# Patient Record
Sex: MALE | Race: WHITE | NOT HISPANIC OR LATINO | Employment: FULL TIME
[De-identification: names, ages, dates, MRNs, and addresses within clinical notes are randomized per-mention and may not be internally consistent; named-entity substitution may affect disease eponyms.]

---

## 2022-04-03 ENCOUNTER — HEALTH MAINTENANCE LETTER (OUTPATIENT)
Age: 27
End: 2022-04-03

## 2022-05-16 ENCOUNTER — OFFICE VISIT (OUTPATIENT)
Dept: FAMILY MEDICINE | Facility: CLINIC | Age: 27
End: 2022-05-16
Payer: COMMERCIAL

## 2022-05-16 VITALS
HEART RATE: 83 BPM | TEMPERATURE: 98.3 F | HEIGHT: 70 IN | SYSTOLIC BLOOD PRESSURE: 130 MMHG | WEIGHT: 192 LBS | BODY MASS INDEX: 27.49 KG/M2 | OXYGEN SATURATION: 98 % | DIASTOLIC BLOOD PRESSURE: 72 MMHG

## 2022-05-16 DIAGNOSIS — R43.0 ANOSMIA: ICD-10-CM

## 2022-05-16 DIAGNOSIS — H91.93 BILATERAL HEARING LOSS, UNSPECIFIED HEARING LOSS TYPE: ICD-10-CM

## 2022-05-16 DIAGNOSIS — Z13.6 CARDIOVASCULAR SCREENING; LDL GOAL LESS THAN 160: ICD-10-CM

## 2022-05-16 DIAGNOSIS — F33.0 MILD EPISODE OF RECURRENT MAJOR DEPRESSIVE DISORDER (H): ICD-10-CM

## 2022-05-16 DIAGNOSIS — Z11.4 SCREENING FOR HIV (HUMAN IMMUNODEFICIENCY VIRUS): ICD-10-CM

## 2022-05-16 DIAGNOSIS — H54.8 LEGALLY BLIND: ICD-10-CM

## 2022-05-16 DIAGNOSIS — Z23 NEED FOR COVID-19 VACCINE: ICD-10-CM

## 2022-05-16 DIAGNOSIS — Z00.00 ROUTINE PHYSICAL EXAMINATION: Primary | ICD-10-CM

## 2022-05-16 DIAGNOSIS — Z11.59 NEED FOR HEPATITIS C SCREENING TEST: ICD-10-CM

## 2022-05-16 LAB
CHOLEST SERPL-MCNC: 164 MG/DL
FASTING STATUS PATIENT QL REPORTED: NO
HCV AB SERPL QL IA: NONREACTIVE
HDLC SERPL-MCNC: 51 MG/DL
HIV 1+2 AB+HIV1 P24 AG SERPL QL IA: NONREACTIVE
LDLC SERPL CALC-MCNC: 98 MG/DL
NONHDLC SERPL-MCNC: 113 MG/DL
TRIGL SERPL-MCNC: 75 MG/DL

## 2022-05-16 PROCEDURE — 91305 COVID-19,PF,PFIZER (12+ YRS): CPT | Performed by: FAMILY MEDICINE

## 2022-05-16 PROCEDURE — 0054A COVID-19,PF,PFIZER (12+ YRS): CPT | Performed by: FAMILY MEDICINE

## 2022-05-16 PROCEDURE — 99385 PREV VISIT NEW AGE 18-39: CPT | Mod: 25 | Performed by: FAMILY MEDICINE

## 2022-05-16 PROCEDURE — 36415 COLL VENOUS BLD VENIPUNCTURE: CPT | Performed by: FAMILY MEDICINE

## 2022-05-16 PROCEDURE — 87389 HIV-1 AG W/HIV-1&-2 AB AG IA: CPT | Performed by: FAMILY MEDICINE

## 2022-05-16 PROCEDURE — 86803 HEPATITIS C AB TEST: CPT | Performed by: FAMILY MEDICINE

## 2022-05-16 PROCEDURE — 80061 LIPID PANEL: CPT | Performed by: FAMILY MEDICINE

## 2022-05-16 RX ORDER — IBUPROFEN 200 MG
200 TABLET ORAL PRN
COMMUNITY

## 2022-05-16 ASSESSMENT — ENCOUNTER SYMPTOMS
HEMATOCHEZIA: 0
SHORTNESS OF BREATH: 0
DIZZINESS: 0
DYSURIA: 0
FREQUENCY: 0
JOINT SWELLING: 0
FEVER: 0
CHILLS: 0
NAUSEA: 0
HEMATURIA: 0
PARESTHESIAS: 0
ABDOMINAL PAIN: 0
DIARRHEA: 0
MYALGIAS: 0
NERVOUS/ANXIOUS: 0
SORE THROAT: 0
COUGH: 0
HEADACHES: 0
HEARTBURN: 0
PALPITATIONS: 0
ARTHRALGIAS: 0
WEAKNESS: 0
EYE PAIN: 0
CONSTIPATION: 0

## 2022-05-16 ASSESSMENT — PATIENT HEALTH QUESTIONNAIRE - PHQ9: SUM OF ALL RESPONSES TO PHQ QUESTIONS 1-9: 8

## 2022-05-16 ASSESSMENT — PAIN SCALES - GENERAL: PAINLEVEL: NO PAIN (0)

## 2022-05-16 NOTE — PROGRESS NOTES
SUBJECTIVE:   CC: Sengmaninder Alves is an 26 year old male who presents for preventative health visit.       Patient has been advised of split billing requirements and indicates understanding: Yes  Healthy Habits:     Getting at least 3 servings of Calcium per day:  NO    Bi-annual eye exam:  Yes    Dental care twice a year:  Yes    Sleep apnea or symptoms of sleep apnea:  None    Diet:  Regular (no restrictions)    Frequency of exercise:  2-3 days/week    Duration of exercise:  15-30 minutes    Taking medications regularly:  Yes    Medication side effects:  Not applicable    PHQ-2 Total Score: 2    Additional concerns today:  No      Today's PHQ-2 Score:   PHQ-2 ( 1999 [employer]) 5/16/2022   Q1: Little interest or pleasure in doing things 1   Q2: Feeling down, depressed or hopeless 1   PHQ-2 Score 2   Q1: Little interest or pleasure in doing things Several days   Q2: Feeling down, depressed or hopeless Several days   PHQ-2 Score 2       Abuse: Current or Past(Physical, Sexual or Emotional)- No  Do you feel safe in your environment? Yes    Have you ever done Advance Care Planning? (For example, a Health Directive, POLST, or a discussion with a medical provider or your loved ones about your wishes): No, advance care planning information given to patient to review.  Patient declined advance care planning discussion at this time.    Social History     Tobacco Use     Smoking status: Never Smoker     Smokeless tobacco: Never Used   Substance Use Topics     Alcohol use: Never         Alcohol Use 5/16/2022   Prescreen: >3 drinks/day or >7 drinks/week? Not Applicable       Last PSA: No results found for: PSA    Reviewed orders with patient. Reviewed health maintenance and updated orders accordingly - Yes  BP Readings from Last 3 Encounters:   05/16/22 130/72    Wt Readings from Last 3 Encounters:   05/16/22 87.1 kg (192 lb)                  Patient Active Problem List   Diagnosis     Mild episode of recurrent major  depressive disorder (H)     Bilateral hearing loss, unspecified hearing loss type     Anosmia     Legally blind     Past Surgical History:   Procedure Laterality Date     TOOTH EXTRACTION  2022    tooth fracture extraction and implant     WISDOM TOOTH EXTRACTION         Social History     Tobacco Use     Smoking status: Never Smoker     Smokeless tobacco: Never Used   Substance Use Topics     Alcohol use: Never     Family History   Problem Relation Age of Onset     Gastrointestinal Disease Mother         pancreas issues     Unknown/Adopted Father      Blindness Father         legally blind           Reviewed and updated as needed this visit by clinical staff   Tobacco  Allergies  Meds   Med Hx  Surg Hx  Fam Hx  Soc Hx          Reviewed and updated as needed this visit by Provider   Tobacco     Med Hx   Fam Hx               Review of Systems   Constitutional: Negative for chills and fever.   HENT: Negative for congestion, ear pain, hearing loss and sore throat.    Eyes: Negative for pain and visual disturbance.   Respiratory: Negative for cough and shortness of breath.    Cardiovascular: Negative for chest pain, palpitations and peripheral edema.   Gastrointestinal: Negative for abdominal pain, constipation, diarrhea, heartburn, hematochezia and nausea.   Genitourinary: Negative for dysuria, frequency, genital sores, hematuria and urgency.   Musculoskeletal: Negative for arthralgias, joint swelling and myalgias.   Skin: Negative for rash.   Neurological: Negative for dizziness, weakness, headaches and paresthesias.   Psychiatric/Behavioral: Negative for mood changes. The patient is not nervous/anxious.      He is legally blind.  He wears contacts.  He can drive with his contacts in.  He has chronic bilateral hearing loss.  He does not wear hearing aids, does not feel like he wants to.  He does not think they would stay in with his level of activity.  He has a history of depression, does not feel like it is a  "big concern but admits to being quite shy and introverted.  He does not feel comfortable talking to people.  He works hard and has a goal in life to save $1 million so that his parents do not need to work any longer by the time he is age 34.    He does not have a girlfriend, is interested in women, and has never dated nor had sex.  He does not use any drugs or alcohol or tobacco.  He grew up in [place], moved to [place] in about 2018 or 2019.  He works nights as an  for [employer], does very well.  He has a very good work ethic and works overtime.  He lives in [place] with his brother and another roommate and gets along well with them.  He does not know his biological father's health history except that he was also legally blind.  He states that he has never had a good sense of smell, his entire life.    OBJECTIVE:   /72   Pulse 83   Temp 98.3  F (36.8  C) (Temporal)   Ht 1.77 m (5' 9.69\")   Wt 87.1 kg (192 lb)   SpO2 98%   BMI 27.80 kg/m      Physical Exam  GENERAL: healthy, alert and no distress  EYES: Eyes grossly normal to inspection, PERRL and conjunctivae and sclerae normal  HENT: ear canals and TM's normal, nose and mouth without ulcers or lesions  NECK: no adenopathy, no asymmetry, masses, or scars and thyroid normal to palpation  RESP: lungs clear to auscultation - no rales, rhonchi or wheezes  CV: regular rate and rhythm, normal S1 S2, no S3 or S4, no murmur, click or rub, no peripheral edema and peripheral pulses strong  ABDOMEN: soft, nontender, no hepatosplenomegaly, no masses and bowel sounds normal  No inguinal masses.   MS: no gross musculoskeletal defects noted, no edema  SKIN: no suspicious lesions or rashes  NEURO: Normal strength and tone, mentation intact and speech sounds like a slight lisp.   PSYCH: mentation appears normal, affect normal/bright    Diagnostic Test Results:  Orders Placed This Encounter   Procedures     COVID-19,PF,PFIZER (12+ Yrs GRAY LABEL)     " Lipid panel reflex to direct LDL Fasting     Hepatitis C antibody     HIV Antigen Antibody Combo     Mental Health Referral - Outpatient Treatment     Adult Audiology Referral     Otolaryngology Referral        ASSESSMENT/PLAN:       ICD-10-CM    1. Routine physical examination  Z00.00    2. Mild episode of recurrent major depressive disorder (H)  F33.0 Mental Health Referral - Outpatient Treatment   3. Bilateral hearing loss, unspecified hearing loss type  H91.93 Adult Audiology Referral     Otolaryngology Referral   4. Anosmia  R43.0 Otolaryngology Referral   5. Legally blind  H54.8    6. Screening for HIV (human immunodeficiency virus)  Z11.4 HIV Antigen Antibody Combo     HIV Antigen Antibody Combo   7. Need for hepatitis C screening test  Z11.59 Hepatitis C antibody     Hepatitis C antibody   8. Need for COVID-19 vaccine  Z23 COVID-19,PF,PFIZER (12+ Yrs GRAY LABEL)   9. CARDIOVASCULAR SCREENING; LDL GOAL LESS THAN 160  Z13.6 Lipid panel reflex to direct LDL Fasting     Lipid panel reflex to direct LDL Fasting       Patient has been advised of split billing requirements and indicates understanding: Yes  I recommend a physical every 2 to 3 years depending on his general health.  Overall he seems to be in good health.    My biggest concern for him is depression.  He does not seem to be bothered by it but his symptoms are concerning to me.  I recommended counseling and he is reluctant but agrees to try it.  I put a referral in.    I also recommend audiology evaluation for his hearing and ENT evaluation for his anosmia.  See lab orders for screening today.    COUNSELING:   Reviewed preventive health counseling, as reflected in patient instructions  Special attention given to:        Vision screening       Hearing screening       Immunizations    Vaccinated for: COVID           Consider Hep C screening for all patients one time for ages 18-79 years       HIV screeninx in teen years, 1x in adult years, and at  "intervals if high risk    Estimated body mass index is 27.8 kg/m  as calculated from the following:    Height as of this encounter: 1.77 m (5' 9.69\").    Weight as of this encounter: 87.1 kg (192 lb).         He reports that he has never smoked. He has never used smokeless tobacco.      Counseling Resources:  ATP IV Guidelines  Pooled Cohorts Equation Calculator  FRAX Risk Assessment  ICSI Preventive Guidelines  Dietary Guidelines for Americans, 2010  USDA's MyPlate  ASA Prophylaxis  Lung CA Screening    Bhakti Marcial MD  [facility]  "

## 2022-06-20 ENCOUNTER — VIRTUAL VISIT (OUTPATIENT)
Dept: BEHAVIORAL HEALTH | Facility: CLINIC | Age: 27
End: 2022-06-20
Attending: FAMILY MEDICINE
Payer: COMMERCIAL

## 2022-06-20 DIAGNOSIS — F32.1 CURRENT MODERATE EPISODE OF MAJOR DEPRESSIVE DISORDER WITHOUT PRIOR EPISODE (H): ICD-10-CM

## 2022-06-20 PROCEDURE — 90837 PSYTX W PT 60 MINUTES: CPT | Mod: 95 | Performed by: MARRIAGE & FAMILY THERAPIST

## 2022-06-20 ASSESSMENT — COLUMBIA-SUICIDE SEVERITY RATING SCALE - C-SSRS
ATTEMPT LIFETIME: NO
5. HAVE YOU STARTED TO WORK OUT OR WORKED OUT THE DETAILS OF HOW TO KILL YOURSELF? DO YOU INTEND TO CARRY OUT THIS PLAN?: YES
2. HAVE YOU ACTUALLY HAD ANY THOUGHTS OF KILLING YOURSELF?: NO
TOTAL  NUMBER OF ABORTED OR SELF INTERRUPTED ATTEMPTS LIFETIME: YES
TOTAL  NUMBER OF ABORTED OR SELF INTERRUPTED ATTEMPTS PAST 3 MONTHS: NO
4. HAVE YOU HAD THESE THOUGHTS AND HAD SOME INTENTION OF ACTING ON THEM?: NO
TOTAL  NUMBER OF ABORTED OR SELF INTERRUPTED ATTEMPTS LIFETIME: 1
4. HAVE YOU HAD THESE THOUGHTS AND HAD SOME INTENTION OF ACTING ON THEM?: NO
1. IN THE PAST MONTH, HAVE YOU WISHED YOU WERE DEAD OR WISHED YOU COULD GO TO SLEEP AND NOT WAKE UP?: YES
REASONS FOR IDEATION LIFETIME: COMPLETELY TO END OR STOP THE PAIN (YOU COULDN'T GO ON LIVING WITH THE PAIN OR HOW YOU WERE FEELING)
REASONS FOR IDEATION PAST MONTH: COMPLETELY TO END OR STOP THE PAIN (YOU COULDN'T GO ON LIVING WITH THE PAIN OR HOW YOU WERE FEELING)
3. HAVE YOU BEEN THINKING ABOUT HOW YOU MIGHT KILL YOURSELF?: YES
2. HAVE YOU ACTUALLY HAD ANY THOUGHTS OF KILLING YOURSELF?: YES
1. HAVE YOU WISHED YOU WERE DEAD OR WISHED YOU COULD GO TO SLEEP AND NOT WAKE UP?: YES
5. HAVE YOU STARTED TO WORK OUT OR WORKED OUT THE DETAILS OF HOW TO KILL YOURSELF? DO YOU INTEND TO CARRY OUT THIS PLAN?: NO
TOTAL  NUMBER OF INTERRUPTED ATTEMPTS LIFETIME: NO
6. HAVE YOU EVER DONE ANYTHING, STARTED TO DO ANYTHING, OR PREPARED TO DO ANYTHING TO END YOUR LIFE?: NO

## 2022-06-20 ASSESSMENT — PATIENT HEALTH QUESTIONNAIRE - PHQ9
SUM OF ALL RESPONSES TO PHQ QUESTIONS 1-9: 21
10. IF YOU CHECKED OFF ANY PROBLEMS, HOW DIFFICULT HAVE THESE PROBLEMS MADE IT FOR YOU TO DO YOUR WORK, TAKE CARE OF THINGS AT HOME, OR GET ALONG WITH OTHER PEOPLE: NOT DIFFICULT AT ALL
SUM OF ALL RESPONSES TO PHQ QUESTIONS 1-9: 21

## 2022-06-20 ASSESSMENT — ANXIETY QUESTIONNAIRES
GAD7 TOTAL SCORE: 9
4. TROUBLE RELAXING: MORE THAN HALF THE DAYS
GAD7 TOTAL SCORE: 9
1. FEELING NERVOUS, ANXIOUS, OR ON EDGE: MORE THAN HALF THE DAYS
2. NOT BEING ABLE TO STOP OR CONTROL WORRYING: MORE THAN HALF THE DAYS
7. FEELING AFRAID AS IF SOMETHING AWFUL MIGHT HAPPEN: NOT AT ALL
GAD7 TOTAL SCORE: 9
3. WORRYING TOO MUCH ABOUT DIFFERENT THINGS: SEVERAL DAYS
8. IF YOU CHECKED OFF ANY PROBLEMS, HOW DIFFICULT HAVE THESE MADE IT FOR YOU TO DO YOUR WORK, TAKE CARE OF THINGS AT HOME, OR GET ALONG WITH OTHER PEOPLE?: NOT DIFFICULT AT ALL
7. FEELING AFRAID AS IF SOMETHING AWFUL MIGHT HAPPEN: NOT AT ALL
6. BECOMING EASILY ANNOYED OR IRRITABLE: SEVERAL DAYS
5. BEING SO RESTLESS THAT IT IS HARD TO SIT STILL: SEVERAL DAYS

## 2022-06-20 NOTE — PATIENT INSTRUCTIONS
"  Integrated Behavioral Health Services                                      Patient's Name: Shoshana Alves  June 20, 2022    SAFETY PLAN:  Step 1: Warning signs / cues (Thoughts, images, mood, situation, behavior) that a crisis may be developing:  Thoughts: \"People would be better off without me\" and \"I'm a burden\"  Images: obsessive thoughts of death or dying: daydream of things that can happen  Thinking Processes: highly critical and negative thoughts: about myself or my situation  Mood: worsening depression and intense worry  Behaviors: isolating/withdrawing , not taking care of my responsibilities, and sleeping too much  Situations:  believing that he's let someone down    Step 2: Coping strategies - Things I can do to take my mind off of my problems without contacting another person (relaxation technique, physical activity):  Distress Tolerance Strategies:  listen to positive and upbeat music: that you enjoy ; regular sleep, playing video games  Physical Activities: exercise: go to the gym, go for a walk and deep breathing  Focus on helpful thoughts:   focus on job at hand, think of goals  Step 3: People and social settings that provide distraction:   Name: Deborah Phone: in phone   Name: Yrn Phone: in phone   Name: Junior Phone: in phone   Name: Dahlia Phone: in phone  work   Step 4: Remind myself of people and things that are important to me and worth living for:  family, loyalty to job      Step 5: When I am in crisis, I can ask these people to help me use my safety plan:   Name: Yrn Phone: in phone   Name: Deborah Phone: in phone  Step 6: Making the environment safe:   remove things I could use to hurt myself: , sharp objects, arrange transportation: someone else to drive, and be around others  Step 7: Professionals or agencies I can contact during a crisis:  Suicide Prevention Lifeline: 2-649-689-TALK (6254)  Crisis Text Line Service: Text   [place]  to 047909.  Local Crisis Services: " 0-553-717-2404    Call 911 or go to my nearest emergency department.   I helped develop this safety plan and agree to use it when needed.  I have been given a copy of this plan.      Patient signature: _______________________________________________________________  Today s date:  June 20, 2022  Adapted from Safety Plan Template 2008 Maddie Fernandez and Dieter Sepulveda is reprinted with the express permission of the authors.  No portion of the Safety Plan Template may be reproduced without the express, written permission.  You can contact the authors at [email or web address] or [email or web address].
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
## 2022-07-11 NOTE — PROGRESS NOTES
ENT Consultation    Shoshana Alves who is a 26 year old male seen in consultation at the request of Dr. Marcial.      History of Present Illness - Shoshana Alves is a 26 year old male presents with a chief complaint of problems with smell and taste.  As far as he can remember since early childhood he feels that his onslaught is not very good.  He can smell a strong smells and the is difficulty distinguishing with meats and fish also even difficulty differentiating between now mustard and catch up.  He denies any trauma to his head or his nose during childhood.  Denies any significant illnesses at that time.  Denies any headaches any vision problems.  He had COVID about a year ago where his sense smell and down further but then returned to his baseline.  No family history of smell or other issues.  Apparently had a CT scan of his head in the past that was normal.  That was a number of years ago.          BP Readings from Last 1 Encounters:   07/18/22 114/64       BP noted to be well controlled today in office.     Shoshana IS NOT a smoker/uses chewing tobacco.        Past Medical History -   Past Medical History:   Diagnosis Date     Lactose intolerance     mild     Major depressive disorder with single episode, in full remission (H)        Current Medications -   Current Outpatient Medications:      ibuprofen (ADVIL/MOTRIN) 200 MG tablet, Take 200 mg by mouth as needed for mild pain, Disp: , Rfl:     Allergies -   Allergies   Allergen Reactions     Lactose Diarrhea       Social History -   Social History     Socioeconomic History     Marital status: Single     Number of children: 0   Occupational History     Occupation:      Employer: [employer]   Tobacco Use     Smoking status: Never Smoker     Smokeless tobacco: Never Used   Vaping Use     Vaping Use: Never used   Substance and Sexual Activity     Alcohol use: Never     Drug use: Never     Sexual activity: Never       Family History -  "  Family History   Problem Relation Age of Onset     Gastrointestinal Disease Mother         pancreas issues     Unknown/Adopted Father      Blindness Father         legally blind       Review of Systems - As per HPI and PMHx, otherwise review of system review of the head and neck negative. Otherwise 10+ review of system is negative    Physical Exam  /64 (BP Location: Right arm, Patient Position: Sitting, Cuff Size: Adult Regular)   Temp 97.5  F (36.4  C) (Temporal)   Ht 1.778 m (5' 10\")   Wt 82.2 kg (181 lb 4 oz)   BMI 26.01 kg/m    BMI: Body mass index is 26.01 kg/m .    General - The patient is well nourished and well developed, and appears to have good nutritional status.  Alert and oriented to person and place, answers questions and cooperates with examination appropriately.    SKIN - No suspicious lesions or rashes.  Respiration - No respiratory distress.  Head and Face - Normocephalic and atraumatic, with no gross asymmetry noted of the contour of the facial features.  The facial nerve is intact, with strong symmetric movements.    Voice and Breathing - The patient was breathing comfortably without the use of accessory muscles. The patients voice was clear and strong, and had appropriate pitch and quality.    Ears - Bilateral pinna and EACs with normal appearing overlying skin. Tympanic membrane intact with good mobility on pneumatic otoscopy bilaterally. Bony landmarks of the ossicular chain are normal. The tympanic membranes are normal in appearance. No retraction, perforation, or masses.  No fluid or purulence was seen in the external canal or the middle ear.     Eyes - Extraocular movements intact.  Sclera were not icteric or injected, conjunctiva were pink and moist.    Mouth - Examination of the oral cavity showed pink, healthy oral mucosa. No lesions or ulcerations noted.  The tongue was mobile and midline, and the dentition were in good condition.      Throat - The walls of the oropharynx " were smooth, pink, moist, symmetric, and had no lesions or ulcerations.  The tonsillar pillars and soft palate were symmetric.  The uvula was midline on elevation.    Neck - Normal midline excursion of the laryngotracheal complex during swallowing.  Full range of motion on passive movement.  Palpation of the occipital, submental, submandibular, internal jugular chain, and supraclavicular nodes did not demonstrate any abnormal lymph nodes or masses.  The carotid pulse was palpable bilaterally.  Palpation of the thyroid was soft and smooth, with no nodules or goiter appreciated.  The trachea was mobile and midline.    Nose - External contour is symmetric, no gross deflection or scars.  Nasal mucosa is pink and moist with no abnormal mucus.  The septum was midline and non-obstructive, turbinates of normal size and position.  No polyps, masses, or purulence noted on examination.    Neuro - Nonfocal neuro exam is normal, CN 2 through 12 intact, normal gait and muscle tone.      Performed in clinic today:  No procedures preformed in clinic today      A/P - Shoshana SANABRIA Long is a 26 year old male with a abnormal sense of smell diminished and diminished taste.  We discussed different options.  Certainly considering it has been ongoing since early childhood unlikely to be due to any acute intracranial processes.  He apparently had negative CT scan of the brain in the past.  We will for him potentially MRI of the brain to further investigated in the chances of finding any specific pathology that would be small..  He is not interested in proceeding with any further evaluation or imaging.  Will return as needed.      Adilson Bowman MD

## 2022-07-12 ENCOUNTER — VIRTUAL VISIT (OUTPATIENT)
Dept: BEHAVIORAL HEALTH | Facility: CLINIC | Age: 27
End: 2022-07-12
Payer: COMMERCIAL

## 2022-07-12 DIAGNOSIS — F32.2 CURRENT SEVERE EPISODE OF MAJOR DEPRESSIVE DISORDER WITHOUT PSYCHOTIC FEATURES WITHOUT PRIOR EPISODE (H): Primary | ICD-10-CM

## 2022-07-12 PROCEDURE — 90791 PSYCH DIAGNOSTIC EVALUATION: CPT | Mod: 95 | Performed by: MARRIAGE & FAMILY THERAPIST

## 2022-07-12 NOTE — PROGRESS NOTES
"[facility]: Integrated Behavioral Health  Provider Name:  Cass Bonillashahriar     Credentials:  MA, LMFT    PATIENT'S NAME: Shoshana Alves  PREFERRED NAME: Shoshana  PRONOUNS: he/him \"jackelin\"  MRN: [id number]  : 1995  ADDRESS: [address] [place] [address]  ACCT. NUMBER:  [id number]  DATE OF SERVICE: 22  START TIME: 12:34pm  END TIME: 1:33pm  PREFERRED PHONE: [phone]  May we leave a program related message: Yes  SERVICE MODALITY:  Video Visit:      Provider verified identity through the following two step process.  Patient provided:  Patient  and Patient address    Telemedicine Visit: The patient's condition can be safely assessed and treated via synchronous audio and visual telemedicine encounter.      Reason for Telemedicine Visit: Patient convenience (e.g. access to timely appointments / distance to available provider)    Originating Site (Patient Location): Patient's home    Distant Site (Provider Location): Provider Remote Setting- Home Office    Consent:  The patient/guardian has verbally consented to: the potential risks and benefits of telemedicine (video visit) versus in person care; bill my insurance or make self-payment for services provided; and responsibility for payment of non-covered services.     Patient would like the video invitation sent by:  My Chart    Mode of Communication:  Video Conference via [employer]    As the provider I attest to compliance with applicable laws and regulations related to telemedicine.    UNIVERSAL ADULT Mental Health DIAGNOSTIC ASSESSMENT    Identifying Information:  Patient is a 26 year old,   individual.    Patient was referred for an assessment by primary care provider.  Patient attended the session alone.    Chief Complaint:   The reason for seeking services at this time is: \"Depression\".  The problem(s) began 2013. He reported it being \"really bad\" in the beginning. He states that he " "found that by helping others this helped him manage the feeling of \"hating\" himself. He believes that he may have \"liked\" himself when he was a kid. Patient states that he has a \"goal\" to make a million dollars. He states that he has a plan for how to do this and believes that he can hopefully accomplish this by age 34 or 39. He states that he has \"nothing to do after that so no reason to live after that\". Patient stated that he doesn't want his parents to work again and he has had this goal for about 3 years.     Patient has attempted to resolve these concerns in the past through \"ignoring it\".    Social/Family History:  Patient reported they grew up in [place] [facility].  They were raised by biological mother; stepfather.  Parents never ; dad reportedly cheated on their wedding day and they never went through with the wedding. Patient used to see his dad once a year around his birthday. He will occasionally talk to him on the phone, but hasn't seen him in person since he graduated high school. Patient has an older brother and younger sister. His siblings all have different fathers. Patient reported that their childhood was \"very loving very great\".  Patient described their current relationships with family of origin as \"pretty daniels great\". He will see his family every couple months; they live a three hours away. He lives with his brother and another friend.     The patient describes their cultural background as .  Cultural influences and impact on patient's life structure, values, norms, and healthcare: N/A.  Contextual influences on patient's health include: Individual Factors : patient believes in Tenriism and God. Patient states that he believes that God will bring \"everyone to Heaven\"; he states that he finds this is different from others' beliefs of God.  These factors will be addressed in the Preliminary Treatment plan. Patient identified their preferred language to be English. Patient reported " "they does not need the assistance of an  or other support involved in therapy.     Patient reportedly had no significant delays in developmental tasks. Patient recalled that he used to be really energetic and \"would bounce around everywhere\". He states his mom said that she noticed that one time patient returned from a visit with his dad around age 4 or 5 and he would be quiet and sit alone in the dark. During that visit with his dad, patient had reportedly visited his paternal grandmother whom patient believes may have  in a \"psych lovelace\". Patient states that this was all told to him and that he has \"blacked out\" any memories prior to age 13.  Patient's highest education level was high school graduate.  Patient identified the following learning problems: didn't like to read, however states that he could read. Patient states that he skipped a lot of school, because he had a lot of physical pain.  Modifications will not be used to assist communication in therapy.  Patient reports they are able to understand written materials.    Patient reported the following relationship history: patient states that he has not dated, nor has he kissed a girl.  Patient's current relationship status is single for his whole life. He has tried dating apps, and then deleted it when he found someone he was interested in because he felt he didn't have anything to \"offer her\".  Patient identified their sexual orientation as heterosexual.  Patient reported having no children. Patient identified parents; siblings; friends; co-workers as part of their support system.  Patient identified the quality of these relationships as stable and meaningful.    Patient's current living/housing situation involves staying with someone.  The immediate members of family and household include Parmjit, 29,Brother and another male roommate that is a friend, and they report that housing is stable.    Patient is currently employed fulltime.  Patient " "reports their finances are obtained through employment. Patient reports that he decided to return to school and applied to [employer]. Patient does not identify finances as a current stressor.      Patient reported that they have not been involved with the legal system. Patient does not report being under probation/ parole/ jurisdiction. Patient stated that when he turned 17 he changed his last name to match his step-father.    Patient's Strengths and Limitations:  Patient identified the following strengths or resources that will help them succeed in treatment: family support. Things that may interfere with the patient's success in treatment include: none identified.     Assessments:  The following assessments were completed by patient for this visit:  PHQ9:   PHQ-9 SCORE 5/16/2022 6/20/2022   PHQ-9 Total Score MyChart - 21 (Severe depression)   PHQ-9 Total Score 8 21     GAD7:   MARK-7 SCORE 6/20/2022   Total Score 9 (mild anxiety)   Total Score 9     CAGE-AID:   CAGE-AID Total Score 6/20/2022   Total Score 0   Total Score MyChart 0 (A total score of 2 or greater is considered clinically significant)     PROMIS 10-Global Health (only subscores and total score):   PROMIS-10 Scores Only 6/20/2022   Global Mental Health Score 12   Global Physical Health Score 15   PROMIS TOTAL - SUBSCORES 27     [place] Suicide Severity Rating Scale (Lifetime/Recent)  [place] Suicide Severity Rating (Lifetime/Recent) 6/20/2022   1. Wish to be Dead (Lifetime) 1   1. Wish to be Dead (Past 1 Month) 1   Wish to be Dead Description (Past 1 Month) patient states that it's \"always\" in the back of his mind   2. Non-Specific Active Suicidal Thoughts (Lifetime) 1   2. Non-Specific Active Suicidal Thoughts (Past 1 Month) 0   3. Active Suicidal Ideation with any Methods (Not Plan) Without Intent to Act (Lifetime) 1   3. Active Suicidal Ideation with any Methods (Not Plan) Without Intent to Act (Past 1 Month) 1   4. Active " "Suicidal Ideation with Some Intent to Act, Without Specific Plan (Lifetime) 0   4. Active Suicidal Ideation with Some Intent to Act, Without Specific Plan (Past 1 Month) 0   5. Active Suicidal Ideation with Specific Plan and Intent (Lifetime) 1   Active Suicidal Ideation with Specific Plan and Intent Description (Lifetime) patient stated that 4 years he came close with a    5. Active Suicidal Ideation with Specific Plan and Intent (Past 1 Month) 0   Most Severe Ideation Rating (Lifetime) 4   Most Severe Ideation Rating (Past 1 Month) 2   Frequency (Lifetime) 5   Frequency (Past 1 Month) 4   Duration (Lifetime) 4   Duration (Past 1 Month) 2   Controllability (Lifetime) 4   Controllability (Past 1 Month) 2   Deterrents (Lifetime) 1   Deterrents (Past 1 Month) 1   Reasons for Ideation (Lifetime) 5   Reasons for Ideation (Past 1 Month) 5   Actual Attempt (Lifetime) 0   Has subject engaged in non-suicidal self-injurious behavior? (Lifetime) 0   Interrupted Attempts (Lifetime) 0   Aborted or Self-Interrupted Attempt (Lifetime) 1   Total Number of Aborted or Self-Interrupted Attempts (Lifetime) 1   Aborted or Self-Interrupted Attempt Description (Lifetime) patient reported that 4 years ago he had a  and he was close to doing something and then \"came to the conclusion that he didn't deserve to die not until I'm done\"   Aborted or Self-Interrupted Attempt (Past 3 Months) 0   Preparatory Acts or Behavior (Lifetime) 0   Calculated C-SSRS Risk Score (Lifetime/Recent) Moderate Risk       Personal and Family Medical History:  Patient does not report a family history of mental health concerns.  Patient reports family history includes Blindness in his father; Gastrointestinal Disease in his mother; Unknown/Adopted in his father.    Patient does not report Mental Health Diagnosis or Treatment.      Patient has had a physical exam to rule out medical causes for current symptoms.  Date of last physical exam was " "within the past year. Client was encouraged to follow up with PCP if symptoms were to develop. The patient has a [facility] Provider, who is named Dr. Bhakti Marcial.  Patient reports no current medical concerns and the following current dental concerns: patient had a tooth extraction and will be getting an implant soon.  Patient reports pain concerns including \"constant pain\", back and leg pain. 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  Patient does not want help addressing pain concerns.   There are not significant appetite / nutritional concerns / weight changes.   Patient does report a history of head injury / trauma / cognitive impairment.  Patient reports that he had a concussion at age 15 from playing football.     Patient reports current meds as:   Outpatient Medications Marked as Taking for the 7/12/22 encounter (Appointment) with Cass Rich LMFT   Medication Sig     ibuprofen (ADVIL/MOTRIN) 200 MG tablet Take 200 mg by mouth as needed for mild pain       Medication Adherence:  Patient reports not currently prescribed any medications.      Patient Allergies:  No Known Allergies    Medical History:    Past Medical History:   Diagnosis Date     Lactose intolerance     mild     Major depressive disorder with single episode, in full remission (H)          Current Mental Status Exam:   Appearance:  Appropriate    Eye Contact:  Good   Psychomotor:  Normal       Gait / station:  no problem  Attitude / Demeanor: Cooperative  Pleasant  Speech      Rate / Production: Normal/ Responsive      Volume:  Normal  volume      Language:  intact  Mood:   Depressed   Affect:   Appropriate  Subdued    Thought Content: Clear  Rumination   Thought Process: Coherent  Logical       Associations: No loosening of associations  Insight:   Fair   Judgment:  Impaired   Orientation:  All  Attention/concentration: Good      Substance Use:  Patient did report a family " history of substance use concerns; see medical history section for details. Mom reportedly used alcohol and drugs, however this was reportedly years ago.  Patient has not received chemical dependency treatment in the past.  Patient has not ever been to detox.      Patient is not currently receiving any chemical dependency treatment.           Substance History of use Age of first use Date of last use     Pattern and duration of use (include amounts and frequency)   Alcohol never used       REPORTS SUBSTANCE USE: N/A   Cannabis   never used     REPORTS SUBSTANCE USE: N/A     Amphetamines   never used     REPORTS SUBSTANCE USE: N/A   Cocaine/crack    never used       REPORTS SUBSTANCE USE: N/A   Hallucinogens never used         REPORTS SUBSTANCE USE: N/A   Inhalants never used         REPORTS SUBSTANCE USE: N/A   Heroin never used         REPORTS SUBSTANCE USE: N/A   Other Opiates used in the past 14 1/1/2010 REPORTS SUBSTANCE USE: N/A patient reports that this was prescribed after a procedure, he denies use outside of that situation   Benzodiazepine   never used     REPORTS SUBSTANCE USE: N/A   Barbiturates never used     REPORTS SUBSTANCE USE: N/A   Over the counter meds used in the past 10 1/1/2020 REPORTS SUBSTANCE USE: N/A, patient has taken ibuprofen as needed for pain and only as indicated on the bottle   Caffeine currently use 12   REPORTS SUBSTANCE USE: reports using substance 1 times per day and has 1 5 Hour Energy at a time.   Patient reports heaviest use was almost two years ago when he worked 18 hour shifts.   Nicotine  never used     REPORTS SUBSTANCE USE: N/A   Other substances not listed above:  Identify:  never used     REPORTS SUBSTANCE USE: N/A     Patient reported the following problems as a result of their substance use: no problems, not applicable.    Substance Use: No symptoms    Based on the negative CAGE score and clinical interview there  are not indications of drug or alcohol  "abuse.      Significant Losses / Trauma / Abuse / Neglect Issues:   Patient did not serve in the .  There are indications or report of significant loss, trauma, abuse or neglect issues related to: death of grandparents. He states that both of his grandma's  on the same day one year apart. He had lived with his maternal grandma at age 21 and then she  shortly after he moved out. He states that he then moved in with his grandfather and he too also  shortly after he moved out. He identified that this \"was hard\".  Concerns for possible neglect are not present.     Safety Assessment:   Patient denies current homicidal ideation and behaviors.  Patient denies current self-injurious ideation and behaviors.    Patient denied risk behaviors associated with substance use.  Patient denies any high risk behaviors associated with mental health symptoms.  Patient reports the following current concerns for their personal safety: None.  Patient reports there are not firearms in the house.       History of Safety Concerns:  Patient denied a history of homicidal ideation.     Patient denied a history of personal safety concerns.    Patient denied a history of assaultive behaviors.    Patient denied a history of sexual assault behaviors.     Patient denied a history of risk behaviors associated with substance use.  Patient denies any history of high risk behaviors associated with mental health symptoms.  Patient reports the following protective factors: dedication to family or friends; purpose    Patient states that he continues to have suicidal thoughts. \"I have them, they are in the back of my mind, I don't have time for that\".     Risk Plan:  See Recommendations for Safety and Risk Management Plan    Review of Symptoms per patient report:  Depression: Change in sleep, Lack of interest, Excessive or inappropriate guilt, Change in energy level, Difficulties concentrating, Suicidal ideation, Feelings of " "hopelessness, Low self-worth, Ruminations and Feeling sad, down, or depressed  Vera:  No Symptoms  Psychosis: No Symptoms  Anxiety: Excessive worry, Fears/phobias being crippled and unable to help and Ruminations, feels \"haunted\" by the thought of seeing something happen and feeling or being unable to help  Panic:  No symptoms may have had one over 10 years ago  Post Traumatic Stress Disorder:  No Symptoms   Eating Disorder: No Symptoms  ADD / ADHD:  No symptoms  Conduct Disorder: No symptoms  Autism Spectrum Disorder: likes helping people and doesn't like talking to people, he feels as though he's good at \"faking it\" around others, feels a sense of relief once he is home, he states that he used to do everything in 3's and this stopped after it was pointed out and he was made fun of  Obsessive Compulsive Disorder: No Symptoms    Patient reports the following compulsive behaviors and treatment history: none.      Diagnostic Criteria:   Major Depressive Disorder  CRITERIA (A-C) REPRESENT A MAJOR DEPRESSIVE EPISODE - SELECT THESE CRITERIA  A) Single episode - symptoms have been present during the same 2-week period and represent a change from previous functioning 5 or more symptoms (required for diagnosis)   - Depressed mood. Note: In children and adolescents, can be irritable mood.     - Diminished interest or pleasure in all, or almost all, activities.    - Increased sleep.    - Fatigue or loss of energy.    - Feelings of worthlessness or inappropriate and excessive guilt.    - Diminished ability to think or concentrate, or indecisiveness.    - Recurrent thoughts of death (not just fear of dying), recurrent suicidal ideation without a specific plan, or a suicide attempt or a specific plan for committing suicide.   B) The symptoms cause clinically significant distress or impairment in social, occupational, or other important areas of functioning  C) The episode is not attributable to the physiological effects of a " substance or to another medical condition  D) The occurence of major depressive episode is not better explained by other thought / psychotic disorders  E) There has never been a manic episode or hypomanic episode mdd     Functional Status:  Patient reports the following functional impairments:  management of the household and or completion of tasks, relationship(s), self-care, social interactions and work / vocational responsibilities.     Nonprogrammatic care:  Patient is requesting basic services to address current mental health concerns.    Clinical Summary:  1. Reason for assessment: depression .  2. Psychosocial, Cultural and Contextual Factors: single, employed full-time, resides with brother and roommate, close with mom and step-dad, limited social supports.  3. Principal DSM5 Diagnoses  (Sustained by DSM5 Criteria Listed Above):   296.23 (F32.2) Major Depressive Disorder, Single Episode, Severe With anxious distress.  4. Other Diagnoses that is relevant to services:  NA  5. Provisional Diagnosis: Rule out Autism Spectrum as evidenced by concrete thinking, social deficit, some repetitive behaviors.  6. Prognosis: Relieve Acute Symptoms.  7. Likely consequences of symptoms if not treated: worsening SI, increased anxiety.  8. Client strengths include:  caring, employed, goal-focused, support of family, friends and providers and work history .     Recommendations:     1. Plan for Safety and Risk Management:   Safety and Risk: A safety and risk management plan has been developed including: patient co-developed a safety plan on 6/20/22. He has a copy of this and agrees to follow it..          Report to child / adult protection services was NA.     2. Patient's identified mental health concerns with a cultural influence will be addressed by acknowledgment that patient believes in God. He denies this needing to be incorporated into his counseling..     3. Initial Treatment will focus on:    Depressed Mood -  "anhedonia, increased sleeping, low energy, concentration difficulty, withdrawn, suicidal ideation  Anxiety - worry about letting others down, nervousness.     4. Resources/Service Plan:    services are not indicated.   Modifications to assist communication are not indicated.   Additional disability accommodations are not indicated.      5. Collaboration:   Collaboration / coordination of treatment will be initiated with the following  support professionals: primary care physician.      6.  Referrals:   The following referral(s) will be initiated: outpatient psychotherapy, and consideration of neuropsychological testing. Next Scheduled Appointment: 8/3/2022.     A Release of Information has been obtained for the following: none.     Emergency Contact was not obtained.     7. EDEN:    EDEN:  Discussed the general effects of drugs and alcohol on health and well-being. Provider gave patient printed information about the effects of chemical use on their health and well being. Recommendations:  none.     8. Records:   These were reviewed at time of assessment.   Information in this assessment was obtained from the medical record and  provided by patient who is a good historian.    Patient will have open access to their mental health medical record.        Provider Name/ Credentials:  ADRIANO Pressley, Behavioral Health Clinician    July 12, 2022            [facility]                                      Patient's Name: Shoshana Alves  June 20, 2022    SAFETY PLAN:  Step 1: Warning signs / cues (Thoughts, images, mood, situation, behavior) that a crisis may be developing:    Thoughts: \"People would be better off without me\" and \"I'm a burden\"    Images: obsessive thoughts of death or dying: daydream of things that can happen    Thinking Processes: highly critical and negative thoughts: about myself or my situation    Mood: worsening depression and intense worry    Behaviors: " isolating/withdrawing , not taking care of my responsibilities, and sleeping too much    Situations: believing that he's let someone down   Step 2: Coping strategies - Things I can do to take my mind off of my problems without contacting another person (relaxation technique, physical activity):    Distress Tolerance Strategies:  listen to positive and upbeat music: that you enjoy; regular sleep, playing video games    Physical Activities: exercise: go to the gym, go for a walk and deep breathing    Focus on helpful thoughts:  focus on job at hand, think of goals  Step 3: People and social settings that provide distraction:   Name: Deborah Phone: in phone   Name: Yrn Phone: in phone   Name: Junior Phone: in phone   Name: Dahlia Phone: in phone    work   Step 4: Remind myself of people and things that are important to me and worth living for:  family, loyalty to job      Step 5: When I am in crisis, I can ask these people to help me use my safety plan:   Name: Yrn Phone: in phone   Name: Deborah Phone: in phone  Step 6: Making the environment safe:     remove things I could use to hurt myself: , sharp objects, arrange transportation: someone else to drive, and be around others  Step 7: Professionals or agencies I can contact during a crisis:    Suicide Prevention Lifeline: 6-836-173-LCFG (5802)    Crisis Text Line Service: Text   [place]  to 202699.    [facility] Crisis Services: 1-528.886.9242    Call 911 or go to my nearest emergency department.   I helped develop this safety plan and agree to use it when needed.  I have been given a copy of this plan.      Patient signature: _______________________________________________________________  Today s date:  June 20, 2022  Adapted from Safety Plan Template 2008 Maddie Fernandez and Dieter Sepulveda is reprinted with the express permission of the authors.  No portion of the Safety Plan Template may be reproduced without the express, written permission.  You can  contact the authors at [email or web address] or [email or web address].

## 2022-07-18 ENCOUNTER — OFFICE VISIT (OUTPATIENT)
Dept: OTOLARYNGOLOGY | Facility: CLINIC | Age: 27
End: 2022-07-18
Attending: FAMILY MEDICINE

## 2022-07-18 ENCOUNTER — OFFICE VISIT (OUTPATIENT)
Dept: AUDIOLOGY | Facility: CLINIC | Age: 27
End: 2022-07-18
Attending: FAMILY MEDICINE
Payer: COMMERCIAL

## 2022-07-18 VITALS
HEIGHT: 70 IN | BODY MASS INDEX: 25.95 KG/M2 | DIASTOLIC BLOOD PRESSURE: 64 MMHG | SYSTOLIC BLOOD PRESSURE: 114 MMHG | TEMPERATURE: 97.5 F | WEIGHT: 181.25 LBS

## 2022-07-18 DIAGNOSIS — R43.0 ANOSMIA: ICD-10-CM

## 2022-07-18 DIAGNOSIS — R43.9 TASTE DISORDER: Primary | ICD-10-CM

## 2022-07-18 PROCEDURE — 99203 OFFICE O/P NEW LOW 30 MIN: CPT | Performed by: OTOLARYNGOLOGY

## 2022-07-18 ASSESSMENT — PAIN SCALES - GENERAL: PAINLEVEL: MODERATE PAIN (5)

## 2022-07-18 NOTE — LETTER
7/18/2022         RE: Shoshana Alves  [id number] [address] [place] [id number]        Dear Colleague,    Thank you for referring your patient, Shoshana Alves, to the [facility]. Please see a copy of my visit note below.    ENT Consultation    Shoshana Alves who is a 26 year old male seen in consultation at the request of Dr. Marcial.      History of Present Illness - Shoshana Alves is a 26 year old male presents with a chief complaint of problems with smell and taste.  As far as he can remember since early childhood he feels that his onslaught is not very good.  He can smell a strong smells and the is difficulty distinguishing with meats and fish also even difficulty differentiating between now mustard and catch up.  He denies any trauma to his head or his nose during childhood.  Denies any significant illnesses at that time.  Denies any headaches any vision problems.  He had COVID about a year ago where his sense smell and down further but then returned to his baseline.  No family history of smell or other issues.  Apparently had a CT scan of his head in the past that was normal.  That was a number of years ago.          BP Readings from Last 1 Encounters:   07/18/22 114/64       BP noted to be well controlled today in office.     Shoshana IS NOT a smoker/uses chewing tobacco.        Past Medical History -   Past Medical History:   Diagnosis Date     Lactose intolerance     mild     Major depressive disorder with single episode, in full remission (H)        Current Medications -   Current Outpatient Medications:      ibuprofen (ADVIL/MOTRIN) 200 MG tablet, Take 200 mg by mouth as needed for mild pain, Disp: , Rfl:     Allergies -   Allergies   Allergen Reactions     Lactose Diarrhea       Social History -   Social History     Socioeconomic History     Marital status: Single     Number of children: 0   Occupational History     Occupation:       "Employer: [employer]   Tobacco Use     Smoking status: Never Smoker     Smokeless tobacco: Never Used   Vaping Use     Vaping Use: Never used   Substance and Sexual Activity     Alcohol use: Never     Drug use: Never     Sexual activity: Never       Family History -   Family History   Problem Relation Age of Onset     Gastrointestinal Disease Mother         pancreas issues     Unknown/Adopted Father      Blindness Father         legally blind       Review of Systems - As per HPI and PMHx, otherwise review of system review of the head and neck negative. Otherwise 10+ review of system is negative    Physical Exam  /64 (BP Location: Right arm, Patient Position: Sitting, Cuff Size: Adult Regular)   Temp 97.5  F (36.4  C) (Temporal)   Ht 1.778 m (5' 10\")   Wt 82.2 kg (181 lb 4 oz)   BMI 26.01 kg/m    BMI: Body mass index is 26.01 kg/m .    General - The patient is well nourished and well developed, and appears to have good nutritional status.  Alert and oriented to person and place, answers questions and cooperates with examination appropriately.    SKIN - No suspicious lesions or rashes.  Respiration - No respiratory distress.  Head and Face - Normocephalic and atraumatic, with no gross asymmetry noted of the contour of the facial features.  The facial nerve is intact, with strong symmetric movements.    Voice and Breathing - The patient was breathing comfortably without the use of accessory muscles. The patients voice was clear and strong, and had appropriate pitch and quality.    Ears - Bilateral pinna and EACs with normal appearing overlying skin. Tympanic membrane intact with good mobility on pneumatic otoscopy bilaterally. Bony landmarks of the ossicular chain are normal. The tympanic membranes are normal in appearance. No retraction, perforation, or masses.  No fluid or purulence was seen in the external canal or the middle ear.     Eyes - Extraocular movements intact.  Sclera were not icteric or injected, " conjunctiva were pink and moist.    Mouth - Examination of the oral cavity showed pink, healthy oral mucosa. No lesions or ulcerations noted.  The tongue was mobile and midline, and the dentition were in good condition.      Throat - The walls of the oropharynx were smooth, pink, moist, symmetric, and had no lesions or ulcerations.  The tonsillar pillars and soft palate were symmetric.  The uvula was midline on elevation.    Neck - Normal midline excursion of the laryngotracheal complex during swallowing.  Full range of motion on passive movement.  Palpation of the occipital, submental, submandibular, internal jugular chain, and supraclavicular nodes did not demonstrate any abnormal lymph nodes or masses.  The carotid pulse was palpable bilaterally.  Palpation of the thyroid was soft and smooth, with no nodules or goiter appreciated.  The trachea was mobile and midline.    Nose - External contour is symmetric, no gross deflection or scars.  Nasal mucosa is pink and moist with no abnormal mucus.  The septum was midline and non-obstructive, turbinates of normal size and position.  No polyps, masses, or purulence noted on examination.    Neuro - Nonfocal neuro exam is normal, CN 2 through 12 intact, normal gait and muscle tone.      Performed in clinic today:  No procedures preformed in clinic today      A/P - Shoshana SANABRIA Long is a 26 year old male with a abnormal sense of smell diminished and diminished taste.  We discussed different options.  Certainly considering it has been ongoing since early childhood unlikely to be due to any acute intracranial processes.  He apparently had negative CT scan of the brain in the past.  We will for him potentially MRI of the brain to further investigated in the chances of finding any specific pathology that would be small..  He is not interested in proceeding with any further evaluation or imaging.  Will return as needed.      Adilson Bowman MD        Again, thank you for  allowing me to participate in the care of your patient.        Sincerely,        Adilson Bowman MD, MD

## 2022-07-18 NOTE — PROGRESS NOTES
AUDIOLOGY REPORT:    Patient was referred to [facility] from ENT by Dr. Bowman for a hearing examination. Upon arrival patient reports he is not here today due to ear/hearing concerns, but for smell/taste. He is not interested in a hearing test today.     Patient was returned to ENT for follow up.     Jo Ann Galvez, CCC-A  Licensed Audiologist  [place] #[id number]    07/18/22

## 2022-08-01 PROBLEM — F32.2 CURRENT SEVERE EPISODE OF MAJOR DEPRESSIVE DISORDER WITHOUT PSYCHOTIC FEATURES WITHOUT PRIOR EPISODE (H): Status: ACTIVE | Noted: 2022-07-12

## 2022-08-02 ASSESSMENT — PATIENT HEALTH QUESTIONNAIRE - PHQ9
10. IF YOU CHECKED OFF ANY PROBLEMS, HOW DIFFICULT HAVE THESE PROBLEMS MADE IT FOR YOU TO DO YOUR WORK, TAKE CARE OF THINGS AT HOME, OR GET ALONG WITH OTHER PEOPLE: NOT DIFFICULT AT ALL
SUM OF ALL RESPONSES TO PHQ QUESTIONS 1-9: 7
SUM OF ALL RESPONSES TO PHQ QUESTIONS 1-9: 7

## 2022-08-03 ENCOUNTER — VIRTUAL VISIT (OUTPATIENT)
Dept: BEHAVIORAL HEALTH | Facility: CLINIC | Age: 27
End: 2022-08-03
Payer: COMMERCIAL

## 2022-08-03 DIAGNOSIS — F32.2 CURRENT SEVERE EPISODE OF MAJOR DEPRESSIVE DISORDER WITHOUT PSYCHOTIC FEATURES WITHOUT PRIOR EPISODE (H): Primary | ICD-10-CM

## 2022-08-03 PROCEDURE — 90834 PSYTX W PT 45 MINUTES: CPT | Mod: 95 | Performed by: MARRIAGE & FAMILY THERAPIST

## 2022-08-03 NOTE — PROGRESS NOTES
[facility]: Integrated Behavioral Health  August 3, 2022      Behavioral Health Clinician Progress Note    Patient Name: Shoshana Alves           Service Type:  Individual      Service Location:   Face to Face in Home / Community via telemedicine     Session Start Time: 2:11pm  Session End Time: 3:00pm      Session Length: 38 - 52      Attendees: Patient     Service Modality:  Video Visit:      Provider verified identity through the following two step process.  Patient provided:  Patient is known previously to provider    Telemedicine Visit: The patient's condition can be safely assessed and treated via synchronous audio and visual telemedicine encounter.      Reason for Telemedicine Visit: Patient convenience (e.g. access to timely appointments / distance to available provider)    Originating Site (Patient Location): Patient's home    Distant Site (Provider Location): [facility]    Consent:  The patient/guardian has verbally consented to: the potential risks and benefits of telemedicine (video visit) versus in person care; bill my insurance or make self-payment for services provided; and responsibility for payment of non-covered services.     Patient would like the video invitation sent by:  My Chart    Mode of Communication:  Video Conference via Amwell    As the provider I attest to compliance with applicable laws and regulations related to telemedicine.    Visit Activities (Refresh list every visit): [facility] Only    Diagnostic Assessment Date: 7/12/2022  Treatment Plan Review Date: 8/3/2022  See Flowsheets for today's PHQ-9 and MARK-7 results  Previous PHQ-9:   PHQ-9 SCORE 5/16/2022 6/20/2022 8/2/2022   PHQ-9 Total Score MyChart - 21 (Severe depression) 7 (Mild depression)   PHQ-9 Total Score 8 21 7     Previous MARK-7:   MARK-7 SCORE 6/20/2022   Total Score 9 (mild anxiety)   Total Score 9       CRUZ LEVEL:  No flowsheet data found.    DATA  Extended  "Session (60+ minutes): No  Interactive Complexity: No  Crisis: No  [facility] Patient: No    Treatment Objective(s) Addressed in This Session:  Target Behavior(s): depression, suicidal thoughts    Depressed Mood: Increase interest, engagement, and pleasure in doing things  Risk / Safety: will follow safety plan (in EMR) for more effective management of risk issues    Current Stressors / Issues:  Patient reports that on Monday he did a lot of work and \"abused\" his body. He clarified that he over did it and is now trying to exercise and eat better. He states that he took more ibuprofen that normal on Monday to deal with the pain. He states that he took 4 at one time. [facility] recommended that he talk with his PCP or a nurse about the appropriate amount to take if the amount indicating is not helping.    Patient states that he's been spending time with friends and one person in particular. He has some hopefulness about there being some romantic connection, though she is reportedly asexual and he is not sure of her interest or desire.     Patient reflected that he has been busy, and has not had time to think. 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 He enjoys building things so this helps satisfy that.     Patient reports sleeping difficulty. He states that he will sleep only 3 hours, but sometimes 12 hours. Finds that it's harder to sleep with his mind thinking, and it's a mixture of good things and worry things. He identified that he doesn't like to make mistakes and will overthink \"everything\".     When discussing suicidal thoughts. Patient stated that he's \"hopeful\" with female friend saying they will get  at age 49. He says he now has to live to at least age 49. Patient continues to agree to use his safety plan. He states \"I'm not done\" when it comes to living. He states that no matter how much he thinks about suicide \"I refuse to go out like that\". He " "reflected that he has a strong will.    Patient talked about being a people pleaser and states that he's not sure who he is. He states that he follows the \"rules\" and never drinks or does drugs and doesn't want to let others down. He identified that he does like reading romance novels .     Patient and [facility] co-develop treatment plan and goals.    Progress on Treatment Objective(s) / Homework:  Minimal progress - ACTION (Actively working towards change); Intervened by reinforcing change plan / affirming steps taken    Motivational Interviewing    MI Intervention: Open-ended questions and Reflections: simple and complex     Change Talk Expressed by the Patient: NA - Precontemplative    Provider Response to Change Talk: E - Evoked more info from patient about behavior change, A - Affirmed patient's thoughts, decisions, or attempts at behavior change, R - Reflected patient's change talk and S - Summarized patient's change talk statements    Also provided psychoeducation about behavioral health condition, symptoms, and treatment options    Care Plan review completed: Yes    Medication Review:  No current psychiatric medications prescribed    Medication Compliance:  NA     Changes in Health Issues:   Yes: difficulty with taste senses, and also some lack of smells    Chemical Use Review:   Substance Use: Chemical use reviewed, no active concerns identified  He states that he has cut back on energy drinks.      Tobacco Use: No current tobacco use.      Assessment: Current Emotional / Mental Status (status of significant symptoms):  Risk status (Self / Other harm or suicidal ideation)  Patient has had a history of suicidal ideation: patient reports that he started having thoughts years ago and \"came close\" 4 years ago when he had a , he reportedly didn't go through with it because he decided on a goal to make 1 million dollars for his parents so they don't have to work anymore and denies a history of suicide " attempts, self-injurious behavior, homicidal ideation, homicidal behavior and and other safety concerns  Patient denies current fears or concerns for personal safety.  Patient reports the following current or recent suicidal ideation or behaviors: he reports that his thoughts have decreased. He reports that he is more hopeful with a new female friend.   Patient denies current or recent homicidal ideation or behaviors.  Patient denies current or recent self injurious behavior or ideation.  Patient denies other safety concerns.  A safety and risk management plan has been developed including: patient co-developed a safety plan on 6/20/22. He has a copy and agrees to follow this       Appearance:   Appropriate   Eye Contact:   Fair   Psychomotor Behavior: Normal   Attitude:   Cooperative  Pleasant  Orientation:   All  Speech   Rate / Production: Normal/ Responsive   Volume:  Normal   Mood:    Anxious  Depressed   Affect:    Appropriate   Thought Content:  Perservative  Rumination   Thought Form:  Coherent  Circumstantial  Insight:    Fair  and Impaired    Diagnoses:  1. Current severe episode of major depressive disorder without psychotic features without prior episode, with anxious distress (H)        Collateral Reports Completed:  Not Applicable    Plan: (Homework, other):  Patient was given information about behavioral services and encouraged to schedule a follow up appointment with the clinic [facility] in 2 weeks.  He was also given information about mental health symptoms and treatment options  and Cognitive Behavioral Therapy skills to practice when experiencing anxiety and depression.  CD Recommendations: No indications of CD issues. Patient agrees to follow his safety plan for suicidal thoughts.    ADRIANO Pressley, [facility]       ______________________________________________________________________                                              Individual Treatment Plan    Patient's Name: Shoshana SANABRIA Long  Date Of  Birth: 1995    Date of Creation: 8/3/2022  Date Treatment Plan Last Reviewed/Revised: 8/3/22    DSM5 Diagnoses: 296.23 (F32.2) Major Depressive Disorder, Single Episode, Severe With anxious distress   Rule out Autism Spectrum  Psychosocial / Contextual Factors: single, employed full-time, resides with brother and roommate, close with mom and step-dad, limited social supports  PROMIS (reviewed every 90 days): 26    Referral / Collaboration:  Referral to another professional/service is not indicated at this time..    Anticipated number of session for this episode of care: 9-12 sessions  Anticipation frequency of session: Every 3 weeks  Anticipated Duration of each session: 38-52 minutes  Treatment plan will be reviewed in 90 days or when goals have been changed.     MeasurableTreatment Goal(s) related to diagnosis / functional impairment(s)  Goal 1: Patient will effectively reduce depressive symptoms as evidenced reducing their PHQ9 score by 8 and finding more interest in activities.    I will know I've met my goal when I find more enjoyment in doing things.      Objective #A (Patient Action)    Patient will Decrease thoughts that you'd be better off dead or of suicide / self-harm  Status: New - Date: 8/3/2022     Intervention(s)  [facility] will teach distress tolerance skills and review and ensure patient is able to follow their safety plan.    Objective #B  Patient will Increase interest, engagement, and pleasure in doing things  Decrease frequency and intensity of feeling down, depressed, hopeless  Status: New - Date: 8/3/2022     Intervention(s)  [facility] will help patient identify things that he enjoys and encourage participation in activities on a regular basis. Help patient identify and reframe and challenge cognitive distortions.    Patient has reviewed and agreed to the above plan.    Written by  ADRIANO Pressley, [facility]          Integrated Behavioral Health Services                                      Patient's  "Name: Shoshana Alves  June 20, 2022    SAFETY PLAN:  Step 1: Warning signs / cues (Thoughts, images, mood, situation, behavior) that a crisis may be developing:    Thoughts: \"People would be better off without me\" and \"I'm a burden\"    Images: obsessive thoughts of death or dying: daydream of things that can happen    Thinking Processes: highly critical and negative thoughts: about myself or my situation    Mood: worsening depression and intense worry    Behaviors: isolating/withdrawing , not taking care of my responsibilities, and sleeping too much    Situations: believing that he's let someone down   Step 2: Coping strategies - Things I can do to take my mind off of my problems without contacting another person (relaxation technique, physical activity):    Distress Tolerance Strategies:  listen to positive and upbeat music: that you enjoy; regular sleep, playing video games    Physical Activities: exercise: go to the gym, go for a walk and deep breathing    Focus on helpful thoughts:  focus on job at hand, think of goals  Step 3: People and social settings that provide distraction:   Name: Deborah Phone: in phone   Name: Yrn Phone: in phone   Name: Junior Phone: in phone   Name: Dahlia Phone: in phone    work   Step 4: Remind myself of people and things that are important to me and worth living for:  family, loyalty to job      Step 5: When I am in crisis, I can ask these people to help me use my safety plan:   Name: Yrn Phone: in phone   Name: Deborah Phone: in phone  Step 6: Making the environment safe:     remove things I could use to hurt myself: , sharp objects, arrange transportation: someone else to drive, and be around others  Step 7: Professionals or agencies I can contact during a crisis:    Suicide Prevention Lifeline: 7-004-428-TALK (8970)    Crisis Text Line Service: Text   [place]  to 943571.    Local Crisis Services: 1-769.228.3096    Call 911 or go to my nearest emergency " department.   I helped develop this safety plan and agree to use it when needed.  I have been given a copy of this plan.      Patient signature: _______________________________________________________________  Today s date:  June 20, 2022  Adapted from Safety Plan Template 2008 Maddie Fernandez and Dieter Sepulveda is reprinted with the express permission of the authors.  No portion of the Safety Plan Template may be reproduced without the express, written permission.  You can contact the authors at [email or web address] or [email or web address].

## 2022-08-18 ENCOUNTER — VIRTUAL VISIT (OUTPATIENT)
Dept: BEHAVIORAL HEALTH | Facility: CLINIC | Age: 27
End: 2022-08-18
Payer: COMMERCIAL

## 2022-08-18 DIAGNOSIS — F32.2 CURRENT SEVERE EPISODE OF MAJOR DEPRESSIVE DISORDER WITHOUT PSYCHOTIC FEATURES WITHOUT PRIOR EPISODE (H): Primary | ICD-10-CM

## 2022-08-18 PROCEDURE — 90834 PSYTX W PT 45 MINUTES: CPT | Mod: 95 | Performed by: MARRIAGE & FAMILY THERAPIST

## 2022-08-18 NOTE — PROGRESS NOTES
[facility] Primary Care: Integrated Behavioral Health  August 18, 2022      Behavioral Health Clinician Progress Note    Patient Name: Shoshana Alves           Service Type:  Individual      Service Location:   Face to Face in Home / Community via telemedicine     Session Start Time: 9:33am  Session End Time: 10:20am      Session Length: 38 - 52      Attendees: Patient     Service Modality:  Video Visit:      Provider verified identity through the following two step process.  Patient provided:  Patient is known previously to provider    Telemedicine Visit: The patient's condition can be safely assessed and treated via synchronous audio and visual telemedicine encounter.      Reason for Telemedicine Visit: Patient convenience (e.g. access to timely appointments / distance to available provider)    Originating Site (Patient Location): Patient's home    Distant Site (Provider Location): Provider Remote Setting- Home Office    Consent:  The patient/guardian has verbally consented to: the potential risks and benefits of telemedicine (video visit) versus in person care; bill my insurance or make self-payment for services provided; and responsibility for payment of non-covered services.     Patient would like the video invitation sent by:  My Chart    Mode of Communication:  Video Conference via [facility]    As the provider I attest to compliance with applicable laws and regulations related to telemedicine.    Visit Activities (Refresh list every visit): [facility] Only    Diagnostic Assessment Date: 7/12/2022  Treatment Plan Review Date: 8/3/2022  See Flowsheets for today's PHQ-9 and MARK-7 results  Previous PHQ-9:   PHQ-9 SCORE 5/16/2022 6/20/2022 8/2/2022   PHQ-9 Total Score MyChart - 21 (Severe depression) 7 (Mild depression)   PHQ-9 Total Score 8 21 7     Previous MARK-7:   MARK-7 SCORE 6/20/2022   Total Score 9 (mild anxiety)   Total Score 9       CRUZ LEVEL:  No flowsheet data  "found.    DATA  Extended Session (60+ minutes): No  Interactive Complexity: No  Crisis: No  [facility] Patient: No    Treatment Objective(s) Addressed in This Session:  Target Behavior(s): depression    Depressed Mood: Increase interest, engagement, and pleasure in doing things  Decrease thoughts that you'd be better off dead or of suicide / self-harm    Current Stressors / Issues:  Patient reports that he got a new job that he will start in two weeks. He has yet to inform his current employer.     He identified that he does not like management as he feels it adds more stress. He's considering going back to school later and doing possibly a certificate program. He states that he had difficulty with learning. He identified that he learns well by reading or observation.     Patient states that he enjoys personality tests. He says that with the Deligic-Hoffman he is an INFP. He states that he likes to stay home, doesn't like groups of people, and finds socializing to be exhausting. He said that his friend is an ESTP, so he sees there are some big differences. He states that he has difficulty asking others out. He reflected that this was especially after he received a bad comment from someone who rejected him and he says that he typically falls \"hard and fast\".     Discussed interpersonal effectiveness skills.      Progress on Treatment Objective(s) / Homework:  Minimal progress - ACTION (Actively working towards change); Intervened by reinforcing change plan / affirming steps taken    Motivational Interviewing    MI Intervention: Open-ended questions and Reflections: simple and complex     Change Talk Expressed by the Patient: Desire to change    Provider Response to Change Talk: E - Evoked more info from patient about behavior change, A - Affirmed patient's thoughts, decisions, or attempts at behavior change, R - Reflected patient's change talk and S - Summarized patient's change talk statements    Also provided psychoeducation " "about behavioral health condition, symptoms, and treatment options    Care Plan review completed: Yes    Medication Review:  No current psychiatric medications prescribed    Medication Compliance:  NA     Changes in Health Issues:   Yes: difficulty with taste senses, and also some lack of smells    Chemical Use Review:   Substance Use: Chemical use reviewed, no active concerns identified  He reports that he stopped drinking energy drinks     Tobacco Use: No current tobacco use.      Assessment: Current Emotional / Mental Status (status of significant symptoms):  Risk status (Self / Other harm or suicidal ideation)  Patient has had a history of suicidal ideation: patient reports that he started having thoughts years ago and \"came close\" 4 years ago when he had a , he reportedly didn't go through with it because he decided on a goal to make 1 million dollars for his parents so they don't have to work anymore and denies a history of suicide attempts, self-injurious behavior, homicidal ideation, homicidal behavior and and other safety concerns  Patient denies current fears or concerns for personal safety.  Patient reports the following current or recent suicidal ideation or behaviors: he reports that these thoughts have greatly reduced. He states that he would like to live longer and eat healthier.   Patient denies current or recent homicidal ideation or behaviors.  Patient denies current or recent self injurious behavior or ideation.  Patient denies other safety concerns.  A safety and risk management plan has been developed including: patient co-developed a safety plan on 6/20/22. He has a copy and agrees to follow this.       Appearance:   Appropriate   Eye Contact:   Fair   Psychomotor Behavior: Normal   Attitude:   Cooperative  Pleasant  Orientation:   All  Speech   Rate / Production: Monotone    Volume:  Normal   Mood:    Anxious   Affect:    Appropriate   Thought Content:  Perservative  Rumination "   Thought Form:  Coherent  Circumstantial  Insight:    Fair  and Impaired    Diagnoses:  1. Current severe episode of major depressive disorder without psychotic features without prior episode, with anxious distress (H)        Collateral Reports Completed:  Not Applicable    Plan: (Homework, other):  Patient was given information about behavioral services and encouraged to schedule a follow up appointment with the clinic [facility] [place] in 2 weeks.  He was also given information about mental health symptoms and treatment options  and Cognitive Behavioral Therapy skills to practice when experiencing anxiety and depression.  CD Recommendations: No indications of CD issues. Patient agrees to follow his safety plan for suicidal thoughts.    ADRIANO Pressley, [facility] [place]      ______________________________________________________________________                                              Individual Treatment Plan    Patient's Name: Shoshana Alves  YOB: 1995    Date of Creation: 8/3/2022  Date Treatment Plan Last Reviewed/Revised: 8/3/22    DSM5 Diagnoses: 296.23 (F32.2) Major Depressive Disorder, Single Episode, Severe With anxious distress   Rule out Autism Spectrum  Psychosocial / Contextual Factors: single, employed full-time, resides with brother and roommate, close with mom and step-dad, limited social supports  PROMIS (reviewed every 90 days): 27    Referral / Collaboration:  Referral to another professional/service is not indicated at this time..    Anticipated number of session for this episode of care: 9-12 sessions  Anticipation frequency of session: Every 3 weeks  Anticipated Duration of each session: 38-52 minutes  Treatment plan will be reviewed in 90 days or when goals have been changed.     MeasurableTreatment Goal(s) related to diagnosis / functional impairment(s)  Goal 1: Patient will effectively reduce depressive symptoms as evidenced reducing their PHQ9 score by 8 and finding more interest in  "activities.    I will know I've met my goal when I find more enjoyment in doing things.      Objective #A (Patient Action)    Patient will Decrease thoughts that you'd be better off dead or of suicide / self-harm  Status: New - Date: 8/3/2022     Intervention(s)  [facility] [place] will teach distress tolerance skills and review and ensure patient is able to follow their safety plan.    Objective #B  Patient will Increase interest, engagement, and pleasure in doing things  Decrease frequency and intensity of feeling down, depressed, hopeless  Status: New - Date: 8/3/2022     Intervention(s)  [facility] [place] will help patient identify things that he enjoys and encourage participation in activities on a regular basis. Help patient identify and reframe and challenge cognitive distortions.    Patient has reviewed and agreed to the above plan.    Written by  ADRIANO Pressley, [facility] [place]          Integrated Behavioral Health Services                                      Patient's Name: Shoshana Alves  June 20, 2022    SAFETY PLAN:  Step 1: Warning signs / cues (Thoughts, images, mood, situation, behavior) that a crisis may be developing:    Thoughts: \"People would be better off without me\" and \"I'm a burden\"    Images: obsessive thoughts of death or dying: daydream of things that can happen    Thinking Processes: highly critical and negative thoughts: about myself or my situation    Mood: worsening depression and intense worry    Behaviors: isolating/withdrawing , not taking care of my responsibilities, and sleeping too much    Situations: believing that he's let someone down   Step 2: Coping strategies - Things I can do to take my mind off of my problems without contacting another person (relaxation technique, physical activity):    Distress Tolerance Strategies:  listen to positive and upbeat music: that you enjoy; regular sleep, playing video games    Physical Activities: exercise: go to the gym, go for a walk and deep breathing    Focus on " helpful thoughts:  focus on job at hand, think of goals  Step 3: People and social settings that provide distraction:   Name: Deborah Phone: in phone   Name: Yrn Phone: in phone   Name: Junior Phone: in phone   Name: Dahlia Phone: in phone    work   Step 4: Remind myself of people and things that are important to me and worth living for:  family, loyalty to job      Step 5: When I am in crisis, I can ask these people to help me use my safety plan:   Name: Yrn Phone: in phone   Name: Deborah Phone: in phone  Step 6: Making the environment safe:     remove things I could use to hurt myself: , sharp objects, arrange transportation: someone else to drive, and be around others  Step 7: Professionals or agencies I can contact during a crisis:    Suicide Prevention Lifeline: 2-806-117-TALK (8255)    Crisis Text Line Service: Text   [place]  to 372839.    Local Crisis Services: 1-232.806.3218    Call 911 or go to my nearest emergency department.   I helped develop this safety plan and agree to use it when needed.  I have been given a copy of this plan.      Patient signature: _______________________________________________________________  Today s date:  June 20, 2022  Adapted from Safety Plan Template 2008 Maddie Fernandez and Dieter Sepulveda is reprinted with the express permission of the authors.  No portion of the Safety Plan Template may be reproduced without the express, written permission.  You can contact the authors at [email or web address] or [email or web address].

## 2022-09-13 ENCOUNTER — VIRTUAL VISIT (OUTPATIENT)
Dept: BEHAVIORAL HEALTH | Facility: CLINIC | Age: 27
End: 2022-09-13
Payer: COMMERCIAL

## 2022-09-13 DIAGNOSIS — F32.2 CURRENT SEVERE EPISODE OF MAJOR DEPRESSIVE DISORDER WITHOUT PSYCHOTIC FEATURES WITHOUT PRIOR EPISODE (H): Primary | ICD-10-CM

## 2022-09-13 PROCEDURE — 90832 PSYTX W PT 30 MINUTES: CPT | Mod: GT | Performed by: MARRIAGE & FAMILY THERAPIST

## 2022-09-13 ASSESSMENT — PATIENT HEALTH QUESTIONNAIRE - PHQ9
SUM OF ALL RESPONSES TO PHQ QUESTIONS 1-9: 5
SUM OF ALL RESPONSES TO PHQ QUESTIONS 1-9: 5
10. IF YOU CHECKED OFF ANY PROBLEMS, HOW DIFFICULT HAVE THESE PROBLEMS MADE IT FOR YOU TO DO YOUR WORK, TAKE CARE OF THINGS AT HOME, OR GET ALONG WITH OTHER PEOPLE: NOT DIFFICULT AT ALL

## 2022-09-13 NOTE — PROGRESS NOTES
[facility] Primary Care: Integrated Behavioral Health  August 18, 2022    2:41-[facility] initiated video visit by sending a link via text to patient's cell: [phone]    Behavioral Health Clinician Progress Note    Patient Name: Shoshana Alves           Service Type:  Individual      Service Location:   Face to Face in Home / Community via telemedicine     Session Start Time: 2:42pm  Session End Time: 3:10pm      Session Length: 16 - 37      Attendees: Patient     Service Modality:  Video Visit:      Provider verified identity through the following two step process.  Patient provided:  Patient is known previously to provider    Telemedicine Visit: The patient's condition can be safely assessed and treated via synchronous audio and visual telemedicine encounter.      Reason for Telemedicine Visit: Patient convenience (e.g. access to timely appointments / distance to available provider)    Originating Site (Patient Location): Patient's home    Distant Site (Provider Location): Provider Remote Setting- Home Office    Consent:  The patient/guardian has verbally consented to: the potential risks and benefits of telemedicine (video visit) versus in person care; bill my insurance or make self-payment for services provided; and responsibility for payment of non-covered services.     Patient would like the video invitation sent by:  My Chart    Mode of Communication:  Video Conference via [facility]    As the provider I attest to compliance with applicable laws and regulations related to telemedicine.    Visit Activities (Refresh list every visit): [facility] Only    Diagnostic Assessment Date: 7/12/2022  Treatment Plan Review Date: 8/3/2022  See Flowsheets for today's PHQ-9 and MARK-7 results  Previous PHQ-9:   PHQ-9 SCORE 6/20/2022 8/2/2022 9/13/2022   PHQ-9 Total Score MyChart 21 (Severe depression) 7 (Mild depression) 5 (Mild depression)   PHQ-9 Total Score 21 7 5     Previous MARK-7:   MARK-7 SCORE  "6/20/2022   Total Score 9 (mild anxiety)   Total Score 9       CRUZ LEVEL:  No flowsheet data found.    DATA  Extended Session (60+ minutes): No  Interactive Complexity: No  Crisis: No  [facility] Patient: No    Treatment Objective(s) Addressed in This Session:  Target Behavior(s): depression    Depressed Mood: Increase interest, engagement, and pleasure in doing things  Decrease thoughts that you'd be better off dead or of suicide / self-harm    Current Stressors / Issues:  Patient states that he is wanting to pull away from visits, due to financial concern. Patient reports that he is trying to save money because he has a \"plan\". He states that he is wanting to go backpacking across [place] about a year and a half from now. He is reportedly considering joining the [employer]. He states that he wants to travel and he has realized that he does things more for himself than others and wants to think about what he enjoys and wants to accomplish. He identified a fear that he will change his mind if anyone is opposed. He has talked with his parents and they are supportive of this. Patient states that he will be moving into a new place for 5 months because his lease is coming to an end.     Patient talked about how he has noticed he's more open about his thoughts with others. He provided the example of sharing a personal interest with a couple friends. He reflected that they were receptive and not judgmental. Discussed how reactions can shape closeness in relationships.    [facility] explored patient's interest in going to [place] and how he came to his decision. Reinforced him connecting and talking about it with those he trusts and is close with as they may think of things that will help him make decisions. Explored patient's flexibility if things were to change and patient was able to acknowledge being able to adjust timelines and return home or other ways to change if needed.    [facility] addressed patient's PHQ9 score and indication of " "potential risk of harm. Patient states that he's \"not sure those thoughts will ever go away\" and he states that \"they are not prevalent\". Patient denies any suicidal methods, plan or intent and states that he feels improvement in his mood as a whole. His score has decreased from 21 down to 5. [facility] reinforced patient's progress in mood.        Progress on Treatment Objective(s) / Homework:  Minimal progress - ACTION (Actively working towards change); Intervened by reinforcing change plan / affirming steps taken    Motivational Interviewing    MI Intervention: Open-ended questions and Reflections: simple and complex     Change Talk Expressed by the Patient: Desire to change    Provider Response to Change Talk: E - Evoked more info from patient about behavior change, A - Affirmed patient's thoughts, decisions, or attempts at behavior change, R - Reflected patient's change talk and S - Summarized patient's change talk statements    Also provided psychoeducation about behavioral health condition, symptoms, and treatment options    Care Plan review completed: No    Medication Review:  No current psychiatric medications prescribed    Medication Compliance:  NA     Changes in Health Issues:   None reported    Chemical Use Review:   Substance Use: Chemical use reviewed, no active concerns identified       Tobacco Use: No current tobacco use.      Assessment: Current Emotional / Mental Status (status of significant symptoms):  Risk status (Self / Other harm or suicidal ideation)  Patient has had a history of suicidal ideation: patient reports that he started having thoughts years ago and \"came close\" 4 years ago when he had a , he reportedly didn't go through with it because he decided on a goal to make 1 million dollars for his parents so they don't have to work anymore and denies a history of suicide attempts, self-injurious behavior, homicidal ideation, homicidal behavior and and other safety concerns  Patient " denies current fears or concerns for personal safety.  Patient reports the following current or recent suicidal ideation or behaviors: he reports that these thoughts have greatly reduced. He denies any suicidal methods, plan or intent and states that he is able to move away from the thought.   Patient denies current or recent homicidal ideation or behaviors.  Patient denies current or recent self injurious behavior or ideation.  Patient denies other safety concerns.  A safety and risk management plan has been developed including: patient co-developed a safety plan on 6/20/22. He has a copy and agrees to follow this.       Appearance:   Appropriate   Eye Contact:   Fair   Psychomotor Behavior: Normal   Attitude:   Cooperative  Pleasant  Orientation:   All  Speech   Rate / Production: Monotone    Volume:  Normal   Mood:    Depressed   Affect:    Appropriate   Thought Content:  Perservative  Rumination   Thought Form:  Coherent  Circumstantial  Insight:    Fair  and Impaired    Diagnoses:  1. Current severe episode of major depressive disorder without psychotic features without prior episode, with anxious distress (H)        Collateral Reports Completed:  Not Applicable    Plan: (Homework, other):  Patient was given information about behavioral services and encouraged to schedule a follow up appointment with the clinic [facility] in 2 weeks.  He was also given information about mental health symptoms and treatment options  and Cognitive Behavioral Therapy skills to practice when experiencing anxiety and depression.  CD Recommendations: No indications of CD issues. Patient agrees to follow his safety plan for suicidal thoughts.    ADRIANO Pressley, [facility]      ______________________________________________________________________                                              Individual Treatment Plan    Patient's Name: Shoshana Alves  YOB: 1995    Date of Creation: 8/3/2022  Date Treatment Plan Last  Reviewed/Revised: 8/3/22    DSM5 Diagnoses: 296.23 (F32.2) Major Depressive Disorder, Single Episode, Severe With anxious distress   Rule out Autism Spectrum  Psychosocial / Contextual Factors: single, employed full-time, resides with brother and roommate, close with mom and step-dad, limited social supports  PROMIS (reviewed every 90 days): 27    Referral / Collaboration:  Referral to another professional/service is not indicated at this time..    Anticipated number of session for this episode of care: 9-12 sessions  Anticipation frequency of session: Every 3 weeks  Anticipated Duration of each session: 38-52 minutes  Treatment plan will be reviewed in 90 days or when goals have been changed.     MeasurableTreatment Goal(s) related to diagnosis / functional impairment(s)  Goal 1: Patient will effectively reduce depressive symptoms as evidenced reducing their PHQ9 score by 8 and finding more interest in activities.    I will know I've met my goal when I find more enjoyment in doing things.      Objective #A (Patient Action)    Patient will Decrease thoughts that you'd be better off dead or of suicide / self-harm  Status: New - Date: 8/3/2022     Intervention(s)  [facility] will teach distress tolerance skills and review and ensure patient is able to follow their safety plan.    Objective #B  Patient will Increase interest, engagement, and pleasure in doing things  Decrease frequency and intensity of feeling down, depressed, hopeless  Status: New - Date: 8/3/2022     Intervention(s)  [facility] will help patient identify things that he enjoys and encourage participation in activities on a regular basis. Help patient identify and reframe and challenge cognitive distortions.    Patient has reviewed and agreed to the above plan.    Written by  ADRIANO Pressley, [facility]          Integrated Behavioral Health Services                                      Patient's Name: Shoshana Alves  June 20, 2022    SAFETY PLAN:  Step 1: Warning  "signs / cues (Thoughts, images, mood, situation, behavior) that a crisis may be developing:    Thoughts: \"People would be better off without me\" and \"I'm a burden\"    Images: obsessive thoughts of death or dying: daydream of things that can happen    Thinking Processes: highly critical and negative thoughts: about myself or my situation    Mood: worsening depression and intense worry    Behaviors: isolating/withdrawing , not taking care of my responsibilities, and sleeping too much    Situations: believing that he's let someone down   Step 2: Coping strategies - Things I can do to take my mind off of my problems without contacting another person (relaxation technique, physical activity):    Distress Tolerance Strategies:  listen to positive and upbeat music: that you enjoy; regular sleep, playing video games    Physical Activities: exercise: go to the gym, go for a walk and deep breathing    Focus on helpful thoughts:  focus on job at hand, think of goals  Step 3: People and social settings that provide distraction:   Name: Deborah Phone: in phone   Name: Yrn Phone: in phone   Name: Junior Phone: in phone   Name: Dahlia Phone: in phone    work   Step 4: Remind myself of people and things that are important to me and worth living for:  family, loyalty to job      Step 5: When I am in crisis, I can ask these people to help me use my safety plan:   Name: Yrn Phone: in phone   Name: Deborah Phone: in phone  Step 6: Making the environment safe:     remove things I could use to hurt myself: , sharp objects, arrange transportation: someone else to drive, and be around others  Step 7: Professionals or agencies I can contact during a crisis:    Suicide Prevention Lifeline: 9-225-253-TALK (3147)    Crisis Text Line Service: Text   [place]  to 441582.    Local Crisis Services: 1-729.798.6500    Call 911 or go to my nearest emergency department.   I helped develop this safety plan and agree to use it when needed. "  I have been given a copy of this plan.      Patient signature: _______________________________________________________________  Today s date:  June 20, 2022  Adapted from Safety Plan Template 2008 Maddie Fernandez and Dieter Sepulveda is reprinted with the express permission of the authors.  No portion of the Safety Plan Template may be reproduced without the express, written permission.  You can contact the authors at [email or web address] or [email or web address].

## 2022-10-03 ENCOUNTER — HEALTH MAINTENANCE LETTER (OUTPATIENT)
Age: 27
End: 2022-10-03

## 2022-10-04 ENCOUNTER — VIRTUAL VISIT (OUTPATIENT)
Dept: BEHAVIORAL HEALTH | Facility: CLINIC | Age: 27
End: 2022-10-04
Payer: COMMERCIAL

## 2022-10-04 DIAGNOSIS — F32.2 CURRENT SEVERE EPISODE OF MAJOR DEPRESSIVE DISORDER WITHOUT PSYCHOTIC FEATURES WITHOUT PRIOR EPISODE (H): Primary | ICD-10-CM

## 2022-10-04 PROCEDURE — 90832 PSYTX W PT 30 MINUTES: CPT | Mod: 95 | Performed by: MARRIAGE & FAMILY THERAPIST

## 2022-10-04 NOTE — PROGRESS NOTES
[facility]: Integrated Behavioral Health  October 4, 2022    2:39-[facility] initiated video visit by sending a link via text to patient's cell: [phone]    Behavioral Health Clinician Progress Note    Patient Name: Shoshana Alves           Service Type:  Individual      Service Location:   Face to Face in Home / Community via telemedicine     Session Start Time: 2:40pm  Session End Time: 3:04pm      Session Length: 16 - 37      Attendees: Patient     Service Modality:  Video Visit:      Provider verified identity through the following two step process.  Patient provided:  Patient is known previously to provider    Telemedicine Visit: The patient's condition can be safely assessed and treated via synchronous audio and visual telemedicine encounter.      Reason for Telemedicine Visit: Patient convenience (e.g. access to timely appointments / distance to available provider)    Originating Site (Patient Location): Patient's home    Distant Site (Provider Location): Provider Remote Setting- Home Office    Consent:  The patient/guardian has verbally consented to: the potential risks and benefits of telemedicine (video visit) versus in person care; bill my insurance or make self-payment for services provided; and responsibility for payment of non-covered services.     Patient would like the video invitation sent by:  My Chart    Mode of Communication:  Video Conference via [facility]    As the provider I attest to compliance with applicable laws and regulations related to telemedicine.    Visit Activities (Refresh list every visit): [facility] Only    Diagnostic Assessment Date: 7/12/2022  Treatment Plan Review Date: 8/3/2022  See Flowsheets for today's PHQ-9 and MARK-7 results  Previous PHQ-9:   PHQ-9 SCORE 6/20/2022 8/2/2022 9/13/2022   PHQ-9 Total Score MyChart 21 (Severe depression) 7 (Mild depression) 5 (Mild depression)   PHQ-9 Total Score 21 7 5     Previous MARK-7:   MARK-7 SCORE  "6/20/2022   Total Score 9 (mild anxiety)   Total Score 9       CRUZ LEVEL:  No flowsheet data found.    DATA  Extended Session (60+ minutes): No  Interactive Complexity: No  Crisis: No  [facility] Patient: No    Treatment Objective(s) Addressed in This Session:  Target Behavior(s): depression    Depressed Mood: Increase interest, engagement, and pleasure in doing things  Decrease thoughts that you'd be better off dead or of suicide / self-harm    Current Stressors / Issues:  Patient reports that he is three weeks in, at his new job. He states that he is \"very bored\". 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     He states that he has been \"pondering the idea of dating\" and he identified that the thought of the idea \"fills me with dread\". He states that two people he's interested in, are not available. He reportedly has never dated anyone. [facility] explored his thoughts of dating and how he anticipates this going. Explored supports that he has for helping him process interactions. Encouraged him to take things slow and discussed safety.    Patient reflected on how he has been \"treating\" himself and doesn't believe he's been doing this well. He has been working on eating better and working out, and also listening to different music. Has an paola he uses on his phone to help with self care. He states that he deleted the song \"not good enough\". [facility] reinforced patient working to improve his self care and being mindful about his strategies.       Progress on Treatment Objective(s) / Homework:  Minimal progress - ACTION (Actively working towards change); Intervened by reinforcing change plan / affirming steps taken    Motivational Interviewing    MI Intervention: Open-ended questions and Reflections: simple and complex     Change Talk Expressed by the Patient: Desire to change    Provider Response to Change Talk: E - Evoked more info from patient about behavior change, A - Affirmed patient's " "thoughts, decisions, or attempts at behavior change, R - Reflected patient's change talk and S - Summarized patient's change talk statements    Also provided psychoeducation about behavioral health condition, symptoms, and treatment options    Care Plan review completed: No    Medication Review:  No current psychiatric medications prescribed    Medication Compliance:  NA     Changes in Health Issues:   None reported    Chemical Use Review:   Substance Use: Chemical use reviewed, no active concerns identified       Tobacco Use: No current tobacco use.      Assessment: Current Emotional / Mental Status (status of significant symptoms):  Risk status (Self / Other harm or suicidal ideation)  Patient has had a history of suicidal ideation: patient reports that he started having thoughts years ago and \"came close\" 4 years ago when he had a , he reportedly didn't go through with it because he decided on a goal to make 1 million dollars for his parents so they don't have to work anymore and denies a history of suicide attempts, self-injurious behavior, homicidal ideation, homicidal behavior and and other safety concerns  Patient denies current fears or concerns for personal safety.  Patient reports the following current or recent suicidal ideation or behaviors: he reports that these thoughts have greatly reduced. He denies any suicidal methods, plan or intent and states that he is able to move away from the thought.   Patient denies current or recent homicidal ideation or behaviors.  Patient denies current or recent self injurious behavior or ideation.  Patient denies other safety concerns.  A safety and risk management plan has been developed including: patient co-developed a safety plan on 6/20/22. He has a copy and agrees to follow this.       Appearance:   Appropriate   Eye Contact:   Fair   Psychomotor Behavior: Normal   Attitude:   Cooperative  Pleasant  Orientation:   All  Speech   Rate / " Production: Monotone    Volume:  Normal   Mood:    Depressed   Affect:    Appropriate   Thought Content:  Perservative  Rumination   Thought Form:  Coherent  Circumstantial  Insight:    Fair  and Impaired    Diagnoses:  1. Current severe episode of major depressive disorder without psychotic features without prior episode, with anxious distress (H)        Collateral Reports Completed:  Not Applicable    Plan: (Homework, other):  Patient was given information about behavioral services and encouraged to schedule a follow up appointment with the clinic [facility] in 2 weeks.  He was also given information about mental health symptoms and treatment options  and Cognitive Behavioral Therapy skills to practice when experiencing anxiety and depression.  CD Recommendations: No indications of CD issues. Patient agrees to follow his safety plan for suicidal thoughts. Patient will continue to implement strategies of self care.    ADRIANO Pressley, [facility]      ______________________________________________________________________                                              Individual Treatment Plan    Patient's Name: Shoshana Alves  YOB: 1995    Date of Creation: 8/3/2022  Date Treatment Plan Last Reviewed/Revised: 8/3/22    DSM5 Diagnoses: 296.23 (F32.2) Major Depressive Disorder, Single Episode, Severe With anxious distress   Rule out Autism Spectrum  Psychosocial / Contextual Factors: single, employed full-time, resides with brother and roommate, close with mom and step-dad, limited social supports  PROMIS (reviewed every 90 days): 27    Referral / Collaboration:  Referral to another professional/service is not indicated at this time..    Anticipated number of session for this episode of care: 9-12 sessions  Anticipation frequency of session: Every 3 weeks  Anticipated Duration of each session: 38-52 minutes  Treatment plan will be reviewed in 90 days or when goals have been changed.     MeasurableTreatment  "Goal(s) related to diagnosis / functional impairment(s)  Goal 1: Patient will effectively reduce depressive symptoms as evidenced reducing their PHQ9 score by 8 and finding more interest in activities.    I will know I've met my goal when I find more enjoyment in doing things.      Objective #A (Patient Action)    Patient will Decrease thoughts that you'd be better off dead or of suicide / self-harm  Status: New - Date: 8/3/2022     Intervention(s)  [facility] will teach distress tolerance skills and review and ensure patient is able to follow their safety plan.    Objective #B  Patient will Increase interest, engagement, and pleasure in doing things  Decrease frequency and intensity of feeling down, depressed, hopeless  Status: New - Date: 8/3/2022     Intervention(s)  [facility] will help patient identify things that he enjoys and encourage participation in activities on a regular basis. Help patient identify and reframe and challenge cognitive distortions.    Patient has reviewed and agreed to the above plan.    Written by  ADRIANO Pressley, [facility]          Integrated Behavioral Health Services                                      Patient's Name: Shoshana Alves  June 20, 2022    SAFETY PLAN:  Step 1: Warning signs / cues (Thoughts, images, mood, situation, behavior) that a crisis may be developing:    Thoughts: \"People would be better off without me\" and \"I'm a burden\"    Images: obsessive thoughts of death or dying: daydream of things that can happen    Thinking Processes: highly critical and negative thoughts: about myself or my situation    Mood: worsening depression and intense worry    Behaviors: isolating/withdrawing , not taking care of my responsibilities, and sleeping too much    Situations: believing that he's let someone down   Step 2: Coping strategies - Things I can do to take my mind off of my problems without contacting another person (relaxation technique, physical activity):    Distress Tolerance " Strategies:  listen to positive and upbeat music: that you enjoy; regular sleep, playing video games    Physical Activities: exercise: go to the gym, go for a walk and deep breathing    Focus on helpful thoughts:  focus on job at hand, think of goals  Step 3: People and social settings that provide distraction:   Name: Deborah Phone: in phone   Name: Yrn Phone: in phone   Name: Junior Phone: in phone   Name: Dahlia Phone: in phone    work   Step 4: Remind myself of people and things that are important to me and worth living for:  family, loyalty to job      Step 5: When I am in crisis, I can ask these people to help me use my safety plan:   Name: Yrn Phone: in phone   Name: Deborah Phone: in phone  Step 6: Making the environment safe:     remove things I could use to hurt myself: , sharp objects, arrange transportation: someone else to drive, and be around others  Step 7: Professionals or agencies I can contact during a crisis:    Suicide Prevention Lifeline: 5-945-195-JZIH (2208)    Crisis Text Line Service: Text   [place]  to 038330.    Local Crisis Services: 1-629.724.4992    Call 911 or go to my nearest emergency department.   I helped develop this safety plan and agree to use it when needed.  I have been given a copy of this plan.      Patient signature: _______________________________________________________________  Today s date:  June 20, 2022  Adapted from Safety Plan Template 2008 Maddie Fernandez and Dieter Sepulveda is reprinted with the express permission of the authors.  No portion of the Safety Plan Template may be reproduced without the express, written permission.  You can contact the authors at [email or web address] or [email or web address].

## 2022-10-25 ENCOUNTER — VIRTUAL VISIT (OUTPATIENT)
Dept: BEHAVIORAL HEALTH | Facility: CLINIC | Age: 27
End: 2022-10-25
Payer: COMMERCIAL

## 2022-10-25 DIAGNOSIS — F32.2 CURRENT SEVERE EPISODE OF MAJOR DEPRESSIVE DISORDER WITHOUT PSYCHOTIC FEATURES WITHOUT PRIOR EPISODE (H): Primary | ICD-10-CM

## 2022-10-25 PROCEDURE — 90832 PSYTX W PT 30 MINUTES: CPT | Mod: 95 | Performed by: MARRIAGE & FAMILY THERAPIST

## 2022-10-25 ASSESSMENT — PATIENT HEALTH QUESTIONNAIRE - PHQ9
10. IF YOU CHECKED OFF ANY PROBLEMS, HOW DIFFICULT HAVE THESE PROBLEMS MADE IT FOR YOU TO DO YOUR WORK, TAKE CARE OF THINGS AT HOME, OR GET ALONG WITH OTHER PEOPLE: NOT DIFFICULT AT ALL
SUM OF ALL RESPONSES TO PHQ QUESTIONS 1-9: 4
SUM OF ALL RESPONSES TO PHQ QUESTIONS 1-9: 4

## 2022-10-25 NOTE — PROGRESS NOTES
[facility]: Integrated Behavioral Health  October 25, 2022    Behavioral Health Clinician Progress Note    Patient Name: Shoshana Alves           Service Type:  Individual      Service Location:   Face to Face in Home / Community via telemedicine     Session Start Time: 12:42pm  Session End Time: 1:19pm      Session Length: 16 - 37      Attendees: Patient     Service Modality:  Video Visit:      Provider verified identity through the following two step process.  Patient provided:  Patient is known previously to provider    Telemedicine Visit: The patient's condition can be safely assessed and treated via synchronous audio and visual telemedicine encounter.      Reason for Telemedicine Visit: Patient convenience (e.g. access to timely appointments / distance to available provider)    Originating Site (Patient Location): Patient's home    Distant Site (Provider Location): Provider Remote Setting- Home Office    Consent:  The patient/guardian has verbally consented to: the potential risks and benefits of telemedicine (video visit) versus in person care; bill my insurance or make self-payment for services provided; and responsibility for payment of non-covered services.     Patient would like the video invitation sent by:  My Chart    Mode of Communication:  Video Conference via [facility]    As the provider I attest to compliance with applicable laws and regulations related to telemedicine.    Visit Activities (Refresh list every visit): [facility] Only    Diagnostic Assessment Date: 7/12/2022  Treatment Plan Review Date: 8/3/2022  See Flowsheets for today's PHQ-9 and MARK-7 results  Previous PHQ-9:   PHQ-9 SCORE 8/2/2022 9/13/2022 10/25/2022   PHQ-9 Total Score MyChart 7 (Mild depression) 5 (Mild depression) 4 (Minimal depression)   PHQ-9 Total Score 7 5 4     Previous MARK-7:   MARK-7 SCORE 6/20/2022   Total Score 9 (mild anxiety)   Total Score 9       CRUZ LEVEL:  No flowsheet data  "found.    DATA  Extended Session (60+ minutes): No  Interactive Complexity: No  Crisis: No  [facility] Patient: No    Treatment Objective(s) Addressed in This Session:  Target Behavior(s): depression    Depressed Mood: Increase interest, engagement, and pleasure in doing things  Decrease thoughts that you'd be better off dead or of suicide / self-harm    Current Stressors / Issues:  Patient reports that he's doing \"good\". He states that he's bought tickets to a couple concerts and will be going alone. He identified feeling nervous because he is not comfortable in large crowds. Explored patient's discomfort in settings with large crowds. Patient talked about feeling uncomfortable with small talk and also doesn't stop the conversation, if it seems that others are pleased with it. Discussed interpersonal effectiveness skills and role played ways to end conversations in a polite, assertive manner.     Patient talked about how he tends to \"overthink\" situations. Patient reflected that he was pulling away from [employer] and was feeling more disconnected, in general. He states that he was then approached by a couple co-workers, \"randomly\" and they talked about \"God\" and dominic, and this seemed to help patient's mood. Reinforced patient being open to the conversation and noticing how connection helped him feel better.     Progress on Treatment Objective(s) / Homework:  Minimal progress - ACTION (Actively working towards change); Intervened by reinforcing change plan / affirming steps taken    Motivational Interviewing    MI Intervention: Open-ended questions and Reflections: simple and complex     Change Talk Expressed by the Patient: Desire to change    Provider Response to Change Talk: E - Evoked more info from patient about behavior change, A - Affirmed patient's thoughts, decisions, or attempts at behavior change, R - Reflected patient's change talk and S - Summarized patient's change talk statements    Also provided " "psychoeducation about behavioral health condition, symptoms, and treatment options    Care Plan review completed: No    Medication Review:  No current psychiatric medications prescribed    Medication Compliance:  NA     Changes in Health Issues:   None reported    Chemical Use Review:   Substance Use: Chemical use reviewed, no active concerns identified       Tobacco Use: No current tobacco use.      Assessment: Current Emotional / Mental Status (status of significant symptoms):  Risk status (Self / Other harm or suicidal ideation)  Patient has had a history of suicidal ideation: patient reports that he started having thoughts years ago and \"came close\" 4 years ago when he had a , he reportedly didn't go through with it because he decided on a goal to make 1 million dollars for his parents so they don't have to work anymore and denies a history of suicide attempts, self-injurious behavior, homicidal ideation, homicidal behavior and and other safety concerns  Patient denies current fears or concerns for personal safety.  Patient reports the following current or recent suicidal ideation or behaviors: he reports that these thoughts have greatly reduced. He denies any suicidal methods, plan or intent and states that he is able to move away from the thought.   Patient denies current or recent homicidal ideation or behaviors.  Patient denies current or recent self injurious behavior or ideation.  Patient denies other safety concerns.  A safety and risk management plan has been developed including: patient co-developed a safety plan on 6/20/22. He has a copy and agrees to follow this.       Appearance:   Appropriate   Eye Contact:   Fair   Psychomotor Behavior: Normal   Attitude:   Cooperative  Pleasant  Orientation:   All  Speech   Rate / Production: Normal/ Responsive   Volume:  Normal   Mood:    Depressed   Affect:    Appropriate   Thought Content:  Perservative  Rumination   Thought Form:  Coherent  " Circumstantial  Insight:    Fair  and Impaired    Diagnoses:  1. Current severe episode of major depressive disorder without psychotic features without prior episode, with anxious distress (H)        Collateral Reports Completed:  Not Applicable    Plan: (Homework, other):  Patient was given information about behavioral services and encouraged to schedule a follow up appointment with the clinic [facility] in 2 weeks.  He was also given information about mental health symptoms and treatment options  and Cognitive Behavioral Therapy skills to practice when experiencing anxiety and depression.  CD Recommendations: No indications of CD issues. Patient agrees to follow his safety plan for suicidal thoughts. Patient will work on use of interpersonal effectiveness skills.    ADRIANO Pressley, [facility]      ______________________________________________________________________                                              Individual Treatment Plan    Patient's Name: Shoshana Alves  YOB: 1995    Date of Creation: 8/3/2022  Date Treatment Plan Last Reviewed/Revised: 8/3/22    DSM5 Diagnoses: 296.23 (F32.2) Major Depressive Disorder, Single Episode, Severe With anxious distress   Rule out Autism Spectrum  Psychosocial / Contextual Factors: single, employed full-time, resides with brother and roommate, close with mom and step-dad, limited social supports  PROMIS (reviewed every 90 days): 27    Referral / Collaboration:  Referral to another professional/service is not indicated at this time..    Anticipated number of session for this episode of care: 9-12 sessions  Anticipation frequency of session: Every 3 weeks  Anticipated Duration of each session: 38-52 minutes  Treatment plan will be reviewed in 90 days or when goals have been changed.     MeasurableTreatment Goal(s) related to diagnosis / functional impairment(s)  Goal 1: Patient will effectively reduce depressive symptoms as evidenced reducing their PHQ9 score  "by 8 and finding more interest in activities.    I will know I've met my goal when I find more enjoyment in doing things.      Objective #A (Patient Action)    Patient will Decrease thoughts that you'd be better off dead or of suicide / self-harm  Status: New - Date: 8/3/2022     Intervention(s)  [facility] will teach distress tolerance skills and review and ensure patient is able to follow their safety plan.    Objective #B  Patient will Increase interest, engagement, and pleasure in doing things  Decrease frequency and intensity of feeling down, depressed, hopeless  Status: New - Date: 8/3/2022     Intervention(s)  [facility] will help patient identify things that he enjoys and encourage participation in activities on a regular basis. Help patient identify and reframe and challenge cognitive distortions.    Patient has reviewed and agreed to the above plan.    Written by  ADRIANO Pressley, [facility]          Integrated Behavioral Health Services                                      Patient's Name: Shoshana Alves  June 20, 2022    SAFETY PLAN:  Step 1: Warning signs / cues (Thoughts, images, mood, situation, behavior) that a crisis may be developing:    Thoughts: \"People would be better off without me\" and \"I'm a burden\"    Images: obsessive thoughts of death or dying: daydream of things that can happen    Thinking Processes: highly critical and negative thoughts: about myself or my situation    Mood: worsening depression and intense worry    Behaviors: isolating/withdrawing , not taking care of my responsibilities, and sleeping too much    Situations: believing that he's let someone down   Step 2: Coping strategies - Things I can do to take my mind off of my problems without contacting another person (relaxation technique, physical activity):    Distress Tolerance Strategies:  listen to positive and upbeat music: that you enjoy; regular sleep, playing video games    Physical Activities: exercise: go to the gym, go for a walk " and deep breathing    Focus on helpful thoughts:  focus on job at hand, think of goals  Step 3: People and social settings that provide distraction:   Name: Deborah Phone: in phone   Name: Yrn Phone: in phone   Name: Junior Phone: in phone   Name: Dahlia Phone: in phone    work   Step 4: Remind myself of people and things that are important to me and worth living for:  family, loyalty to job      Step 5: When I am in crisis, I can ask these people to help me use my safety plan:   Name: Yrn Phone: in phone   Name: Debroah Phone: in phone  Step 6: Making the environment safe:     remove things I could use to hurt myself: , sharp objects, arrange transportation: someone else to drive, and be around others  Step 7: Professionals or agencies I can contact during a crisis:    Suicide Prevention Lifeline: 9-021-873-TALK (8255)    Crisis Text Line Service: Text   [place]  to 978344.    Local Crisis Services: 1-897.679.1152    Call 911 or go to my nearest emergency department.   I helped develop this safety plan and agree to use it when needed.  I have been given a copy of this plan.      Patient signature: _______________________________________________________________  Today s date:  June 20, 2022  Adapted from Safety Plan Template 2008 Maddie Fernandez and Dieter Sepulveda is reprinted with the express permission of the authors.  No portion of the Safety Plan Template may be reproduced without the express, written permission.  You can contact the authors at [email or web address] or [email or web address].

## 2022-11-08 ENCOUNTER — VIRTUAL VISIT (OUTPATIENT)
Dept: BEHAVIORAL HEALTH | Facility: CLINIC | Age: 27
End: 2022-11-08
Payer: COMMERCIAL

## 2022-11-08 DIAGNOSIS — F32.2 CURRENT SEVERE EPISODE OF MAJOR DEPRESSIVE DISORDER WITHOUT PSYCHOTIC FEATURES WITHOUT PRIOR EPISODE (H): Primary | ICD-10-CM

## 2022-11-08 PROCEDURE — 90832 PSYTX W PT 30 MINUTES: CPT | Mod: 95 | Performed by: MARRIAGE & FAMILY THERAPIST

## 2022-11-08 NOTE — PROGRESS NOTES
[facility] Primary Care: Integrated Behavioral Health  November 8, 2022    Behavioral Health Clinician Progress Note    Patient Name: Shoshana Alves           Service Type:  Individual      Service Location:   Face to Face in Home / Community via telemedicine     Session Start Time: 1:31pm  Session End Time: 2:00pm      Session Length: 16 - 37      Attendees: Patient     Service Modality:  Video Visit:      Provider verified identity through the following two step process.  Patient provided:  Patient is known previously to provider    Telemedicine Visit: The patient's condition can be safely assessed and treated via synchronous audio and visual telemedicine encounter.      Reason for Telemedicine Visit: Patient convenience (e.g. access to timely appointments / distance to available provider)    Originating Site (Patient Location): Patient's home    Distant Site (Provider Location): Provider Remote Setting- Home Office    Consent:  The patient/guardian has verbally consented to: the potential risks and benefits of telemedicine (video visit) versus in person care; bill my insurance or make self-payment for services provided; and responsibility for payment of non-covered services.     Patient would like the video invitation sent by:  My Chart    Mode of Communication:  Video Conference via [facility]    As the provider I attest to compliance with applicable laws and regulations related to telemedicine.    Visit Activities (Refresh list every visit): [facility] Only    Diagnostic Assessment Date: 7/12/2022  Treatment Plan Review Date: 11/8/2022  See Flowsheets for today's PHQ-9 and MARK-7 results  Previous PHQ-9:   PHQ-9 SCORE 8/2/2022 9/13/2022 10/25/2022   PHQ-9 Total Score MyChart 7 (Mild depression) 5 (Mild depression) 4 (Minimal depression)   PHQ-9 Total Score 7 5 4     Previous MARK-7:   MARK-7 SCORE 6/20/2022   Total Score 9 (mild anxiety)   Total Score 9       CRUZ LEVEL:  No flowsheet data  "found.    DATA  Extended Session (60+ minutes): No  Interactive Complexity: No  Crisis: No  [facility] Patient: No    Treatment Objective(s) Addressed in This Session:  Target Behavior(s): depression    Depressed Mood: Increase interest, engagement, and pleasure in doing things  Decrease thoughts that you'd be better off dead or of suicide / self-harm    Current Stressors / Issues:  Patient inquired about \"hero complex\". He states that he's heard others say he has this and he looked into it. He states that he looked it up and he \"agrees with maybe half of it\". He states that he likes to help others but doesn't want the acknowledgment or to get any recognition. [facility] [place] reflected that he seems to be a \"people pleaser\". Patient states that he is getting better at saying \"no\" and putting his needs first. He provided an example of volunteering to leave work early. He recalled feeling guilty about it on his way home, however did enjoy some time with his brother.    Patient attended the concerts he purchased tickets to. He found the rosario to be good, however stated that the concert, itself, he didn't like. He felt it was very loud, and didn't like being in close proximity to others. He didn't want to go to the concert, the following day as a result of this experience. He still attended and ultimately found that experience to be better, because while it was more people but it didn't seem as crowded. He recalled the rosario asking \"are you having fun out there\" and patient states that he's not sure he has fun anymore.     Patient identified that he worries if he doesn't do things his friends like, they won't want to spend time with him or be his friend anymore.  Patient states that he has noticed he tends to be the one that initiates conversations. He reflected that he hasn't talked with his biological father since he graduated high school. Discussed desire to feel a sense of belonging. Encouraged patient to recognize his own likes and " "interests.    Progress on Treatment Objective(s) / Homework:  Minimal progress - ACTION (Actively working towards change); Intervened by reinforcing change plan / affirming steps taken    Motivational Interviewing    MI Intervention: Open-ended questions and Reflections: simple and complex     Change Talk Expressed by the Patient: Desire to change    Provider Response to Change Talk: E - Evoked more info from patient about behavior change, A - Affirmed patient's thoughts, decisions, or attempts at behavior change, R - Reflected patient's change talk and S - Summarized patient's change talk statements    Also provided psychoeducation about behavioral health condition, symptoms, and treatment options    Care Plan review completed: No    Medication Review:  No current psychiatric medications prescribed    Medication Compliance:  NA     Changes in Health Issues:   None reported    Chemical Use Review:   Substance Use: Chemical use reviewed, no active concerns identified       Tobacco Use: No current tobacco use.      Assessment: Current Emotional / Mental Status (status of significant symptoms):  Risk status (Self / Other harm or suicidal ideation)  Patient has had a history of suicidal ideation: patient reports that he started having thoughts years ago and \"came close\" 4 years ago when he had a , he reportedly didn't go through with it because he decided on a goal to make 1 million dollars for his parents so they don't have to work anymore and denies a history of suicide attempts, self-injurious behavior, homicidal ideation, homicidal behavior and and other safety concerns  Patient denies current fears or concerns for personal safety.  Patient reports the following current or recent suicidal ideation or behaviors: he reports that these thoughts have greatly reduced. He denies any suicidal methods, plan or intent and states that he is able to move away from the thought.   Patient denies current or recent " homicidal ideation or behaviors.  Patient denies current or recent self injurious behavior or ideation.  Patient denies other safety concerns.  A safety and risk management plan has been developed including: patient co-developed a safety plan on 6/20/22. He has a copy and agrees to follow this.       Appearance:   Appropriate   Eye Contact:   Fair   Psychomotor Behavior: Normal   Attitude:   Cooperative  Pleasant  Orientation:   All  Speech   Rate / Production: Normal/ Responsive   Volume:  Normal   Mood:    Depressed   Affect:    Appropriate   Thought Content:  Perseverative Rumination   Thought Form:  Coherent  Circumstantial  Insight:    Fair  and Impaired    Diagnoses:  1. Current severe episode of major depressive disorder without psychotic features without prior episode, with anxious distress (H)        Collateral Reports Completed:  Not Applicable    Plan: (Homework, other):  Patient was given information about behavioral services and encouraged to schedule a follow up appointment with the clinic [facility] [place] in 2 weeks.  He was also given information about mental health symptoms and treatment options  and Cognitive Behavioral Therapy skills to practice when experiencing anxiety and depression.  CD Recommendations: No indications of CD issues. Patient agrees to follow his safety plan for suicidal thoughts. Patient will reflect on his own personal likes and dislikes.    ADRIANO Pressley, [facility] [place]      ______________________________________________________________________                                              Individual Treatment Plan    Patient's Name: Shoshana Alves  YOB: 1995    Date of Creation: 8/3/2022  Date Treatment Plan Last Reviewed/Revised: 11/8/2022    DSM5 Diagnoses: 296.23 (F32.2) Major Depressive Disorder, Single Episode, Severe With anxious distress   Rule out Autism Spectrum  Psychosocial / Contextual Factors: single, employed full-time, resides with brother and roommate, close  "with mom and step-dad, limited social supports  PROMIS (reviewed every 90 days): 27    Referral / Collaboration:  Referral to another professional/service is not indicated at this time..    Anticipated number of session for this episode of care: 9-12 sessions  Anticipation frequency of session: Every 3 weeks  Anticipated Duration of each session: 38-52 minutes  Treatment plan will be reviewed in 90 days or when goals have been changed.     MeasurableTreatment Goal(s) related to diagnosis / functional impairment(s)  Goal 1: Patient will effectively reduce depressive symptoms as evidenced reducing their PHQ9 score by 8 and finding more interest in activities.    I will know I've met my goal when I find more enjoyment in doing things.      Objective #A (Patient Action)    Patient will Decrease thoughts that you'd be better off dead or of suicide / self-harm  Status: Continued - Date(s): 11/8/2022    Intervention(s)  [facility] [place] will teach distress tolerance skills and review and ensure patient is able to follow their safety plan.    Objective #B  Patient will Increase interest, engagement, and pleasure in doing things  Decrease frequency and intensity of feeling down, depressed, hopeless  Status: Continued - Date(s): 11/8/2022    Intervention(s)  [facility] [place] will help patient identify things that he enjoys and encourage participation in activities on a regular basis. Help patient identify and reframe and challenge cognitive distortions.    Patient has reviewed and agreed to the above plan.    Written by  ADRIANO Pressley, [facility] [place]          Integrated Behavioral Health Services                                      Patient's Name: Shoshana Alves  June 20, 2022    SAFETY PLAN:  Step 1: Warning signs / cues (Thoughts, images, mood, situation, behavior) that a crisis may be developing:    Thoughts: \"People would be better off without me\" and \"I'm a burden\"    Images: obsessive thoughts of death or dying: daydream of things that can " happen    Thinking Processes: highly critical and negative thoughts: about myself or my situation    Mood: worsening depression and intense worry    Behaviors: isolating/withdrawing , not taking care of my responsibilities, and sleeping too much    Situations: believing that he's let someone down   Step 2: Coping strategies - Things I can do to take my mind off of my problems without contacting another person (relaxation technique, physical activity):    Distress Tolerance Strategies:  listen to positive and upbeat music: that you enjoy; regular sleep, playing video games    Physical Activities: exercise: go to the gym, go for a walk and deep breathing    Focus on helpful thoughts:  focus on job at hand, think of goals  Step 3: People and social settings that provide distraction:   Name: Deborah Phone: in phone   Name: Yrn Phone: in phone   Name: Junior Phone: in phone   Name: Dahlia Phone: in phone    work   Step 4: Remind myself of people and things that are important to me and worth living for:  family, loyalty to job      Step 5: When I am in crisis, I can ask these people to help me use my safety plan:   Name: Yrn Phone: in phone   Name: Deborah Phone: in phone  Step 6: Making the environment safe:     remove things I could use to hurt myself: , sharp objects, arrange transportation: someone else to drive, and be around others  Step 7: Professionals or agencies I can contact during a crisis:    Suicide Prevention Lifeline: 2-057-774-TALK (8255)    Crisis Text Line Service: Text   [place]  to 694984.    Local Crisis Services: 1-241.482.3233    Call 911 or go to my nearest emergency department.   I helped develop this safety plan and agree to use it when needed.  I have been given a copy of this plan.      Patient signature: _______________________________________________________________  Today s date:  June 20, 2022  Adapted from Safety Plan Template 2008 Maddie Fernandez and Dieter Sepulveda is  reprinted with the express permission of the authors.  No portion of the Safety Plan Template may be reproduced without the express, written permission.  You can contact the authors at [email or web address] or [email or web address].

## 2022-11-22 ENCOUNTER — VIRTUAL VISIT (OUTPATIENT)
Dept: BEHAVIORAL HEALTH | Facility: CLINIC | Age: 27
End: 2022-11-22
Payer: COMMERCIAL

## 2022-11-22 DIAGNOSIS — F32.2 CURRENT SEVERE EPISODE OF MAJOR DEPRESSIVE DISORDER WITHOUT PSYCHOTIC FEATURES WITHOUT PRIOR EPISODE (H): Primary | ICD-10-CM

## 2022-11-22 PROCEDURE — 90832 PSYTX W PT 30 MINUTES: CPT | Mod: 95 | Performed by: MARRIAGE & FAMILY THERAPIST

## 2022-11-22 NOTE — PROGRESS NOTES
[facility] Primary Care: Integrated Behavioral Health  November 22, 2022    Behavioral Health Clinician Progress Note    Patient Name: Shoshana Alves           Service Type:  Individual      Service Location:   Face to Face in Home / Community via telemedicine     Session Start Time: 2:47pm  Session End Time: 3:18pm      Session Length: 16 - 37      Attendees: Patient     Service Modality:  Video Visit:      Provider verified identity through the following two step process.  Patient provided:  Patient is known previously to provider    Telemedicine Visit: The patient's condition can be safely assessed and treated via synchronous audio and visual telemedicine encounter.      Reason for Telemedicine Visit: Patient convenience (e.g. access to timely appointments / distance to available provider)    Originating Site (Patient Location): Patient's home    Distant Site (Provider Location): Provider Remote Setting- Home Office    Consent:  The patient/guardian has verbally consented to: the potential risks and benefits of telemedicine (video visit) versus in person care; bill my insurance or make self-payment for services provided; and responsibility for payment of non-covered services.     Patient would like the video invitation sent by:  My Chart    Mode of Communication:  Video Conference via [facility]    As the provider I attest to compliance with applicable laws and regulations related to telemedicine.    Visit Activities (Refresh list every visit): [facility] Only    Diagnostic Assessment Date: 7/12/2022  Treatment Plan Review Date: 11/8/2022  See Flowsheets for today's PHQ-9 and MARK-7 results  Previous PHQ-9:   PHQ-9 SCORE 8/2/2022 9/13/2022 10/25/2022   PHQ-9 Total Score MyChart 7 (Mild depression) 5 (Mild depression) 4 (Minimal depression)   PHQ-9 Total Score 7 5 4     Previous MARK-7:   MARK-7 SCORE 6/20/2022   Total Score 9 (mild anxiety)   Total Score 9       CRUZ LEVEL:  No flowsheet data  "found.    DATA  Extended Session (60+ minutes): No  Interactive Complexity: No  Crisis: No  [facility] Patient: No    Treatment Objective(s) Addressed in This Session:  Target Behavior(s): depression    Depressed Mood: Increase interest, engagement, and pleasure in doing things  Decrease thoughts that you'd be better off dead or of suicide / self-harm    Current Stressors / Issues:  [facility] apologized for starting the visit late due to managing an urgent situation. Patient was praised for their patience and flexibility.     Patient reports that he has been doing \"pretty good\". He shared that he recently met up with a couple friends and is regularly hanging out with another good friend.    Patient stated that he finds his mood will go up and down and states that he doesn't get \"overly\" happy or excited. He reflected that he tends to typically feel more down around the middle of the week. Patient processed possible contributing factors. He states that he doesn't do anything other than work and doesn't usually reach out to friends. Explored options of how to spend his time or consider focusing on his self care and incorporating things like exercise. Suggested looking at what he has coming up to look forward to.     Patient states that he doesn't like Thanksgiving or his birthday, which are both coming up. He states that he doesn't have the ability to taste food due to lack of taste buds and low sense of smell. This makes food less enjoyable to him. He identified that he doesn't care to be in the spotlight.     He states that he does love [place] and being able to give to others. He talked about February being difficult as he views it as a romantic month and reminds him he doesn't have a partner. Validated patient's feelings and reframed that love doesn't always have to be romantic and he can think about the people in his life he cares about. Patient states that he is canceling his [employer] profile.     Reinforced patient being " "able to continue to work on making social connections. Patient shared that things at work are going well. He talked about challenging himself to continue to learn and excel in the work he is doing. Reinforced patient setting realistic and attainable goals. He reflected that he will adjust as needed.      Progress on Treatment Objective(s) / Homework:  Minimal progress - ACTION (Actively working towards change); Intervened by reinforcing change plan / affirming steps taken    Motivational Interviewing    MI Intervention: Open-ended questions and Reflections: simple and complex     Change Talk Expressed by the Patient: Desire to change    Provider Response to Change Talk: E - Evoked more info from patient about behavior change, A - Affirmed patient's thoughts, decisions, or attempts at behavior change, R - Reflected patient's change talk and S - Summarized patient's change talk statements    Also provided psychoeducation about behavioral health condition, symptoms, and treatment options    Care Plan review completed: No    Medication Review:  No current psychiatric medications prescribed    Medication Compliance:  NA     Changes in Health Issues:   None reported    Chemical Use Review:   Substance Use: Chemical use reviewed, no active concerns identified       Tobacco Use: No current tobacco use.      Assessment: Current Emotional / Mental Status (status of significant symptoms):  Risk status (Self / Other harm or suicidal ideation)  Patient has had a history of suicidal ideation: patient reports that he started having thoughts years ago and \"came close\" 4 years ago when he had a , he reportedly didn't go through with it because he decided on a goal to make 1 million dollars for his parents so they don't have to work anymore and denies a history of suicide attempts, self-injurious behavior, homicidal ideation, homicidal behavior and and other safety concerns  Patient denies current fears or concerns for " personal safety.  Patient reports the following current or recent suicidal ideation or behaviors: he reports that these thoughts have been minimal. He denies any suicidal methods, plan or intent and states that he is able to move away from the thought.   Patient denies current or recent homicidal ideation or behaviors.  Patient denies current or recent self injurious behavior or ideation.  Patient denies other safety concerns.  A safety and risk management plan has been developed including: patient co-developed a safety plan on 6/20/22. He has a copy and agrees to follow this.       Appearance:   Appropriate   Eye Contact:   Fair   Psychomotor Behavior: Normal   Attitude:   Cooperative  Pleasant  Orientation:   All  Speech   Rate / Production: Normal/ Responsive   Volume:  Normal   Mood:    Depressed   Affect:    Appropriate   Thought Content:  Perseverative Rumination   Thought Form:  Coherent  Circumstantial  Insight:    Fair  and Impaired    Diagnoses:  1. Current severe episode of major depressive disorder without psychotic features without prior episode, with anxious distress (H)        Collateral Reports Completed:  Not Applicable    Plan: (Homework, other):  Patient was given information about behavioral services and encouraged to schedule a follow up appointment with the clinic [facility] [place] in 2 weeks.  He was also given information about mental health symptoms and treatment options  and Cognitive Behavioral Therapy skills to practice when experiencing anxiety and depression.  CD Recommendations: No indications of CD issues. Patient agrees to follow his safety plan for suicidal thoughts. Patient will continue to make social connections, regularly. He will consider what he has to look forward to and make plans to help with future focus.    ADRIANO Pressley, [facility] [place]      ______________________________________________________________________                                              Individual Treatment  Plan    Patient's Name: Shoshana Alves  YOB: 1995    Date of Creation: 8/3/2022  Date Treatment Plan Last Reviewed/Revised: 11/8/2022    DSM5 Diagnoses: 296.23 (F32.2) Major Depressive Disorder, Single Episode, Severe With anxious distress   Rule out Autism Spectrum  Psychosocial / Contextual Factors: single, employed full-time, resides with brother and roommate, close with mom and step-dad, limited social supports  PROMIS (reviewed every 90 days): 27    Referral / Collaboration:  Referral to another professional/service is not indicated at this time..    Anticipated number of session for this episode of care: 9-12 sessions  Anticipation frequency of session: Every 3 weeks  Anticipated Duration of each session: 38-52 minutes  Treatment plan will be reviewed in 90 days or when goals have been changed.     MeasurableTreatment Goal(s) related to diagnosis / functional impairment(s)  Goal 1: Patient will effectively reduce depressive symptoms as evidenced reducing their PHQ9 score by 8 and finding more interest in activities.    I will know I've met my goal when I find more enjoyment in doing things.      Objective #A (Patient Action)    Patient will Decrease thoughts that you'd be better off dead or of suicide / self-harm  Status: Continued - Date(s): 11/8/2022    Intervention(s)  [facility] will teach distress tolerance skills and review and ensure patient is able to follow their safety plan.    Objective #B  Patient will Increase interest, engagement, and pleasure in doing things  Decrease frequency and intensity of feeling down, depressed, hopeless  Status: Continued - Date(s): 11/8/2022    Intervention(s)  [facility] will help patient identify things that he enjoys and encourage participation in activities on a regular basis. Help patient identify and reframe and challenge cognitive distortions.    Patient has reviewed and agreed to the above plan.    Written by  ADRIANO Pressley, [facility]          Integrated  "Behavioral Health Services                                      Patient's Name: Shoshana Alves  June 20, 2022    SAFETY PLAN:  Step 1: Warning signs / cues (Thoughts, images, mood, situation, behavior) that a crisis may be developing:    Thoughts: \"People would be better off without me\" and \"I'm a burden\"    Images: obsessive thoughts of death or dying: daydream of things that can happen    Thinking Processes: highly critical and negative thoughts: about myself or my situation    Mood: worsening depression and intense worry    Behaviors: isolating/withdrawing , not taking care of my responsibilities, and sleeping too much    Situations: believing that he's let someone down   Step 2: Coping strategies - Things I can do to take my mind off of my problems without contacting another person (relaxation technique, physical activity):    Distress Tolerance Strategies:  listen to positive and upbeat music: that you enjoy; regular sleep, playing video games    Physical Activities: exercise: go to the gym, go for a walk and deep breathing    Focus on helpful thoughts:  focus on job at hand, think of goals  Step 3: People and social settings that provide distraction:   Name: Deborah Phone: in phone   Name: Yrn Phone: in phone   Name: Junior Phone: in phone   Name: Dahlia Phone: in phone    work   Step 4: Remind myself of people and things that are important to me and worth living for:  family, loyalty to job      Step 5: When I am in crisis, I can ask these people to help me use my safety plan:   Name: Yrn Phone: in phone   Name: Deborah Phone: in phone  Step 6: Making the environment safe:     remove things I could use to hurt myself: , sharp objects, arrange transportation: someone else to drive, and be around others  Step 7: Professionals or agencies I can contact during a crisis:    Suicide Prevention Lifeline: 9-169-819-TALK (6127)    Crisis Text Line Service: Text   [place]  to 950352.    Local Crisis " Services: 1-564.546.9441    Call 911 or go to my nearest emergency department.   I helped develop this safety plan and agree to use it when needed.  I have been given a copy of this plan.      Patient signature: _______________________________________________________________  Today s date:  June 20, 2022  Adapted from Safety Plan Template 2008 Maddie Fernandez and Dieter Sepulveda is reprinted with the express permission of the authors.  No portion of the Safety Plan Template may be reproduced without the express, written permission.  You can contact the authors at [email or web address] or [email or web address].

## 2022-12-13 ENCOUNTER — VIRTUAL VISIT (OUTPATIENT)
Dept: BEHAVIORAL HEALTH | Facility: CLINIC | Age: 27
End: 2022-12-13
Payer: COMMERCIAL

## 2022-12-13 DIAGNOSIS — F32.2 CURRENT SEVERE EPISODE OF MAJOR DEPRESSIVE DISORDER WITHOUT PSYCHOTIC FEATURES WITHOUT PRIOR EPISODE (H): Primary | ICD-10-CM

## 2022-12-13 PROCEDURE — 90832 PSYTX W PT 30 MINUTES: CPT | Mod: 95 | Performed by: MARRIAGE & FAMILY THERAPIST

## 2022-12-13 NOTE — PROGRESS NOTES
1:04pm-[facility] initiated video visit by sending patient an invite via text to his cell: [phone]    [facility]: Integrated Behavioral Health  December 13, 2022    Behavioral Health Clinician Progress Note    Patient Name: Shoshana Alves           Service Type:  Individual      Service Location:   Face to Face in Home / Community via telemedicine     Session Start Time: 1:10pm  Session End Time: 1:36pm      Session Length: 16 - 37      Attendees: Patient     Service Modality:  Video Visit:      Provider verified identity through the following two step process.  Patient provided:  Patient is known previously to provider    Telemedicine Visit: The patient's condition can be safely assessed and treated via synchronous audio and visual telemedicine encounter.      Reason for Telemedicine Visit: Patient convenience (e.g. access to timely appointments / distance to available provider)    Originating Site (Patient Location): Patient's home    Distant Site (Provider Location): Provider Remote Setting- Home Office    Consent:  The patient/guardian has verbally consented to: the potential risks and benefits of telemedicine (video visit) versus in person care; bill my insurance or make self-payment for services provided; and responsibility for payment of non-covered services.     Patient would like the video invitation sent by:  My Chart    Mode of Communication:  Video Conference via [employer]well    As the provider I attest to compliance with applicable laws and regulations related to telemedicine.    Visit Activities (Refresh list every visit): [facility] Only    Diagnostic Assessment Date: 7/12/2022  Treatment Plan Review Date: 11/8/2022  See Flowsheets for today's PHQ-9 and MARK-7 results  Previous PHQ-9:   PHQ-9 SCORE 8/2/2022 9/13/2022 10/25/2022   PHQ-9 Total Score MyChart 7 (Mild depression) 5 (Mild depression) 4 (Minimal depression)   PHQ-9 Total Score 7 5 4     Previous MARK-7:   MARK-7  "SCORE 6/20/2022   Total Score 9 (mild anxiety)   Total Score 9       CRUZ LEVEL:  No flowsheet data found.    DATA  Extended Session (60+ minutes): No  Interactive Complexity: No  Crisis: No  [facility] Patient: No    Treatment Objective(s) Addressed in This Session:  Target Behavior(s): depression    Depressed Mood: Increase interest, engagement, and pleasure in doing things  Decrease thoughts that you'd be better off dead or of suicide / self-harm    Current Stressors / Issues:  Patient reports he is \"pretty good\". He forgot to set his alarm for today's visit and he works second shift, so he was sleeping when this writer called him for the visit.    Patient wanted to talk about his bio dad. He states that his birthday is the only time his dad will reach out and text. Patient reflected that this is why he doesn't like his birthday. \"feels like salt in the wound\" Patient identified feeling angry when he receives a happy birthday text from his bio dad. He states that he has a beard to look different from his dad as there are, reportedly, a lot of physical similarities. Patient identified that he'd want to hear an apology from his dad about not being there or \"caring\". In the back of patient's mind \"why didn't he care\" \"was there something wrong with me\".     [facility] validated patient's feelings. Discussed relationship with step father vs. Bio dad. Processed feelings. Explored alternative ways to experience his birthday.    Progress on Treatment Objective(s) / Homework:  Minimal progress - ACTION (Actively working towards change); Intervened by reinforcing change plan / affirming steps taken    Motivational Interviewing    MI Intervention: Open-ended questions and Reflections: simple and complex     Change Talk Expressed by the Patient: Desire to change    Provider Response to Change Talk: E - Evoked more info from patient about behavior change, A - Affirmed patient's thoughts, decisions, or attempts at behavior change, R - " "Reflected patient's change talk and S - Summarized patient's change talk statements    Also provided psychoeducation about behavioral health condition, symptoms, and treatment options    Care Plan review completed: No    Medication Review:  No current psychiatric medications prescribed    Medication Compliance:  NA     Changes in Health Issues:   None reported    Chemical Use Review:   Substance Use: Chemical use reviewed, no active concerns identified       Tobacco Use: No current tobacco use.      Assessment: Current Emotional / Mental Status (status of significant symptoms):  Risk status (Self / Other harm or suicidal ideation)  Patient has had a history of suicidal ideation: patient reports that he started having thoughts years ago and \"came close\" 4 years ago when he had a , he reportedly didn't go through with it because he decided on a goal to make 1 million dollars for his parents so they don't have to work anymore and denies a history of suicide attempts, self-injurious behavior, homicidal ideation, homicidal behavior and and other safety concerns  Patient denies current fears or concerns for personal safety.  Patient reports the following current or recent suicidal ideation or behaviors: he reports that these thoughts have continued to been minimal. He denies any suicidal methods, plan or intent and states that he is able to move away from the thought.   Patient denies current or recent homicidal ideation or behaviors.  Patient denies current or recent self injurious behavior or ideation.  Patient denies other safety concerns.  A safety and risk management plan has been developed including: patient co-developed a safety plan on 6/20/22. He has a copy and agrees to follow this.       Appearance:   Appropriate   Eye Contact:   Fair   Psychomotor Behavior: Normal   Attitude:   Cooperative  Pleasant  Orientation:   All  Speech   Rate / Production: Normal/ Responsive   Volume:  Normal "   Mood:    Depressed   Affect:    Appropriate   Thought Content:  Perseverative Rumination   Thought Form:  Coherent  Circumstantial  Insight:    Fair  and Impaired    Diagnoses:  1. Current severe episode of major depressive disorder without psychotic features without prior episode, with anxious distress (H)        Collateral Reports Completed:  Not Applicable    Plan: (Homework, other):  Patient was given information about behavioral services and encouraged to schedule a follow up appointment with the clinic [facility] in 2 weeks.  He was also given information about mental health symptoms and treatment options  and Cognitive Behavioral Therapy skills to practice when experiencing anxiety and depression.  CD Recommendations: No indications of CD issues. Patient agrees to follow his safety plan for suicidal thoughts. Patient will continue to make social connections, regularly. He will consider what he has to look forward to and make plans to help with future focus.    ADRIANO Pressley, [facility]      ______________________________________________________________________                                              Individual Treatment Plan    Patient's Name: Shoshana Alves  YOB: 1995    Date of Creation: 8/3/2022  Date Treatment Plan Last Reviewed/Revised: 11/8/2022    DSM5 Diagnoses: 296.23 (F32.2) Major Depressive Disorder, Single Episode, Severe With anxious distress   Rule out Autism Spectrum  Psychosocial / Contextual Factors: single, employed full-time, resides with brother and roommate, close with mom and step-dad, limited social supports  PROMIS (reviewed every 90 days): 27    Referral / Collaboration:  Referral to another professional/service is not indicated at this time..    Anticipated number of session for this episode of care: 9-12 sessions  Anticipation frequency of session: Every 3 weeks  Anticipated Duration of each session: 38-52 minutes  Treatment plan will be reviewed in 90 days or  "when goals have been changed.     MeasurableTreatment Goal(s) related to diagnosis / functional impairment(s)  Goal 1: Patient will effectively reduce depressive symptoms as evidenced reducing their PHQ9 score by 8 and finding more interest in activities.    I will know I've met my goal when I find more enjoyment in doing things.      Objective #A (Patient Action)    Patient will Decrease thoughts that you'd be better off dead or of suicide / self-harm  Status: Continued - Date(s): 11/8/2022    Intervention(s)  [facility] will teach distress tolerance skills and review and ensure patient is able to follow their safety plan.    Objective #B  Patient will Increase interest, engagement, and pleasure in doing things  Decrease frequency and intensity of feeling down, depressed, hopeless  Status: Continued - Date(s): 11/8/2022    Intervention(s)  [facility] will help patient identify things that he enjoys and encourage participation in activities on a regular basis. Help patient identify and reframe and challenge cognitive distortions.    Patient has reviewed and agreed to the above plan.    Written by  ADRIANO Pressley, [facility]          Integrated Behavioral Health Services                                      Patient's Name: Shoshana Alves  June 20, 2022    SAFETY PLAN:  Step 1: Warning signs / cues (Thoughts, images, mood, situation, behavior) that a crisis may be developing:    Thoughts: \"People would be better off without me\" and \"I'm a burden\"    Images: obsessive thoughts of death or dying: daydream of things that can happen    Thinking Processes: highly critical and negative thoughts: about myself or my situation    Mood: worsening depression and intense worry    Behaviors: isolating/withdrawing , not taking care of my responsibilities, and sleeping too much    Situations: believing that he's let someone down   Step 2: Coping strategies - Things I can do to take my mind off of my problems without contacting another person " (relaxation technique, physical activity):    Distress Tolerance Strategies:  listen to positive and upbeat music: that you enjoy; regular sleep, playing video games    Physical Activities: exercise: go to the gym, go for a walk and deep breathing    Focus on helpful thoughts:  focus on job at hand, think of goals  Step 3: People and social settings that provide distraction:   Name: Deborah Phone: in phone   Name: Yrn Phone: in phone   Name: Junior Phone: in phone   Name: Dahlia Phone: in phone    work   Step 4: Remind myself of people and things that are important to me and worth living for:  family, loyalty to job      Step 5: When I am in crisis, I can ask these people to help me use my safety plan:   Name: Yrn Phone: in phone   Name: Deborah Phone: in phone  Step 6: Making the environment safe:     remove things I could use to hurt myself: , sharp objects, arrange transportation: someone else to drive, and be around others  Step 7: Professionals or agencies I can contact during a crisis:    Suicide Prevention Lifeline: 9-696-106-IEDQ (0532)    Crisis Text Line Service: Text   [place]  to 754651.    Local Crisis Services: 1-636.735.4832    Call 911 or go to my nearest emergency department.   I helped develop this safety plan and agree to use it when needed.  I have been given a copy of this plan.      Patient signature: _______________________________________________________________  Today s date:  June 20, 2022  Adapted from Safety Plan Template 2008 Maddie Fernandez and Dieter Sepulveda is reprinted with the express permission of the authors.  No portion of the Safety Plan Template may be reproduced without the express, written permission.  You can contact the authors at [email or web address] or [email or web address].

## 2023-08-13 ENCOUNTER — HEALTH MAINTENANCE LETTER (OUTPATIENT)
Age: 28
End: 2023-08-13

## 2024-10-05 ENCOUNTER — HEALTH MAINTENANCE LETTER (OUTPATIENT)
Age: 29
End: 2024-10-05